# Patient Record
Sex: FEMALE | Race: WHITE | Employment: OTHER | URBAN - METROPOLITAN AREA
[De-identification: names, ages, dates, MRNs, and addresses within clinical notes are randomized per-mention and may not be internally consistent; named-entity substitution may affect disease eponyms.]

---

## 2019-06-22 ENCOUNTER — HOSPITAL ENCOUNTER (INPATIENT)
Age: 69
LOS: 4 days | Discharge: HOME | DRG: 389 | End: 2019-06-26
Attending: EMERGENCY MEDICINE | Admitting: INTERNAL MEDICINE
Payer: MEDICARE

## 2019-06-22 DIAGNOSIS — E87.1 HYPONATREMIA: ICD-10-CM

## 2019-06-22 DIAGNOSIS — E87.6 HYPOKALEMIA: ICD-10-CM

## 2019-06-22 DIAGNOSIS — K56.609 SMALL BOWEL OBSTRUCTION (HCC): Primary | ICD-10-CM

## 2019-06-22 LAB
ABSOLUTE EOS #: 0.04 K/UL (ref 0–0.44)
ABSOLUTE IMMATURE GRANULOCYTE: 0.03 K/UL (ref 0–0.3)
ABSOLUTE LYMPH #: 1.91 K/UL (ref 1.1–3.7)
ABSOLUTE MONO #: 1.25 K/UL (ref 0.1–1.2)
ALBUMIN SERPL-MCNC: 4.2 G/DL (ref 3.5–5.2)
ALBUMIN/GLOBULIN RATIO: 1.5 (ref 1–2.5)
ALP BLD-CCNC: 73 U/L (ref 35–104)
ALT SERPL-CCNC: 15 U/L (ref 5–33)
ANION GAP SERPL CALCULATED.3IONS-SCNC: 14 MMOL/L (ref 9–17)
AST SERPL-CCNC: 21 U/L
BASOPHILS # BLD: 1 % (ref 0–2)
BASOPHILS ABSOLUTE: 0.05 K/UL (ref 0–0.2)
BILIRUB SERPL-MCNC: 0.55 MG/DL (ref 0.3–1.2)
BUN BLDV-MCNC: 7 MG/DL (ref 8–23)
BUN/CREAT BLD: ABNORMAL (ref 9–20)
CALCIUM IONIZED: 1.1 MMOL/L (ref 1.13–1.33)
CALCIUM SERPL-MCNC: 9.9 MG/DL (ref 8.6–10.4)
CHLORIDE BLD-SCNC: 80 MMOL/L (ref 98–107)
CO2: 27 MMOL/L (ref 20–31)
CREAT SERPL-MCNC: 0.71 MG/DL (ref 0.5–0.9)
DIFFERENTIAL TYPE: ABNORMAL
EOSINOPHILS RELATIVE PERCENT: 1 % (ref 1–4)
GFR AFRICAN AMERICAN: >60 ML/MIN
GFR NON-AFRICAN AMERICAN: >60 ML/MIN
GFR SERPL CREATININE-BSD FRML MDRD: ABNORMAL ML/MIN/{1.73_M2}
GFR SERPL CREATININE-BSD FRML MDRD: ABNORMAL ML/MIN/{1.73_M2}
GLUCOSE BLD-MCNC: 110 MG/DL (ref 70–99)
HCT VFR BLD CALC: 36.8 % (ref 36.3–47.1)
HEMOGLOBIN: 12.7 G/DL (ref 11.9–15.1)
IMMATURE GRANULOCYTES: 0 %
LACTIC ACID, WHOLE BLOOD: 1 MMOL/L (ref 0.7–2.1)
LYMPHOCYTES # BLD: 22 % (ref 24–43)
MAGNESIUM: 1.8 MG/DL (ref 1.6–2.6)
MAGNESIUM: 1.8 MG/DL (ref 1.6–2.6)
MCH RBC QN AUTO: 29.4 PG (ref 25.2–33.5)
MCHC RBC AUTO-ENTMCNC: 34.5 G/DL (ref 28.4–34.8)
MCV RBC AUTO: 85.2 FL (ref 82.6–102.9)
MONOCYTES # BLD: 15 % (ref 3–12)
NRBC AUTOMATED: 0 PER 100 WBC
PDW BLD-RTO: 13.2 % (ref 11.8–14.4)
PHOSPHORUS: 4 MG/DL (ref 2.6–4.5)
PLATELET # BLD: 390 K/UL (ref 138–453)
PLATELET ESTIMATE: ABNORMAL
PMV BLD AUTO: 9.1 FL (ref 8.1–13.5)
POTASSIUM SERPL-SCNC: 2.6 MMOL/L (ref 3.7–5.3)
RBC # BLD: 4.32 M/UL (ref 3.95–5.11)
RBC # BLD: ABNORMAL 10*6/UL
SEG NEUTROPHILS: 61 % (ref 36–65)
SEGMENTED NEUTROPHILS ABSOLUTE COUNT: 5.25 K/UL (ref 1.5–8.1)
SODIUM BLD-SCNC: 121 MMOL/L (ref 135–144)
TOTAL PROTEIN: 7 G/DL (ref 6.4–8.3)
WBC # BLD: 8.5 K/UL (ref 3.5–11.3)
WBC # BLD: ABNORMAL 10*3/UL

## 2019-06-22 PROCEDURE — 87641 MR-STAPH DNA AMP PROBE: CPT

## 2019-06-22 PROCEDURE — 2000000000 HC ICU R&B

## 2019-06-22 PROCEDURE — 36415 COLL VENOUS BLD VENIPUNCTURE: CPT

## 2019-06-22 PROCEDURE — 84295 ASSAY OF SERUM SODIUM: CPT

## 2019-06-22 PROCEDURE — 83735 ASSAY OF MAGNESIUM: CPT

## 2019-06-22 PROCEDURE — 99285 EMERGENCY DEPT VISIT HI MDM: CPT

## 2019-06-22 PROCEDURE — 84100 ASSAY OF PHOSPHORUS: CPT

## 2019-06-22 PROCEDURE — 83605 ASSAY OF LACTIC ACID: CPT

## 2019-06-22 PROCEDURE — 80053 COMPREHEN METABOLIC PANEL: CPT

## 2019-06-22 PROCEDURE — 6360000002 HC RX W HCPCS: Performed by: PHYSICIAN ASSISTANT

## 2019-06-22 PROCEDURE — 2580000003 HC RX 258: Performed by: EMERGENCY MEDICINE

## 2019-06-22 PROCEDURE — 2580000003 HC RX 258: Performed by: PHYSICIAN ASSISTANT

## 2019-06-22 PROCEDURE — 82330 ASSAY OF CALCIUM: CPT

## 2019-06-22 PROCEDURE — 6370000000 HC RX 637 (ALT 250 FOR IP): Performed by: EMERGENCY MEDICINE

## 2019-06-22 PROCEDURE — 96365 THER/PROPH/DIAG IV INF INIT: CPT

## 2019-06-22 PROCEDURE — 6360000002 HC RX W HCPCS: Performed by: EMERGENCY MEDICINE

## 2019-06-22 PROCEDURE — 96375 TX/PRO/DX INJ NEW DRUG ADDON: CPT

## 2019-06-22 PROCEDURE — 6360000002 HC RX W HCPCS: Performed by: STUDENT IN AN ORGANIZED HEALTH CARE EDUCATION/TRAINING PROGRAM

## 2019-06-22 PROCEDURE — 83930 ASSAY OF BLOOD OSMOLALITY: CPT

## 2019-06-22 PROCEDURE — 85025 COMPLETE CBC W/AUTO DIFF WBC: CPT

## 2019-06-22 RX ORDER — MAGNESIUM SULFATE 1 G/100ML
2 INJECTION INTRAVENOUS ONCE
Status: COMPLETED | OUTPATIENT
Start: 2019-06-22 | End: 2019-06-23

## 2019-06-22 RX ORDER — TRAZODONE HYDROCHLORIDE 50 MG/1
50 TABLET ORAL NIGHTLY
Status: DISCONTINUED | OUTPATIENT
Start: 2019-06-22 | End: 2019-06-26 | Stop reason: HOSPADM

## 2019-06-22 RX ORDER — SODIUM CHLORIDE 0.9 % (FLUSH) 0.9 %
10 SYRINGE (ML) INJECTION PRN
Status: DISCONTINUED | OUTPATIENT
Start: 2019-06-22 | End: 2019-06-26 | Stop reason: HOSPADM

## 2019-06-22 RX ORDER — MORPHINE SULFATE 2 MG/ML
2 INJECTION, SOLUTION INTRAMUSCULAR; INTRAVENOUS
Status: DISCONTINUED | OUTPATIENT
Start: 2019-06-22 | End: 2019-06-26 | Stop reason: HOSPADM

## 2019-06-22 RX ORDER — POTASSIUM CHLORIDE 7.45 MG/ML
10 INJECTION INTRAVENOUS PRN
Status: DISCONTINUED | OUTPATIENT
Start: 2019-06-22 | End: 2019-06-26 | Stop reason: HOSPADM

## 2019-06-22 RX ORDER — LOSARTAN POTASSIUM 50 MG/1
50 TABLET ORAL DAILY
Status: DISCONTINUED | OUTPATIENT
Start: 2019-06-23 | End: 2019-06-26 | Stop reason: HOSPADM

## 2019-06-22 RX ORDER — 0.9 % SODIUM CHLORIDE 0.9 %
1000 INTRAVENOUS SOLUTION INTRAVENOUS ONCE
Status: COMPLETED | OUTPATIENT
Start: 2019-06-22 | End: 2019-06-22

## 2019-06-22 RX ORDER — ACETAMINOPHEN 325 MG/1
650 TABLET ORAL EVERY 4 HOURS PRN
Status: DISCONTINUED | OUTPATIENT
Start: 2019-06-22 | End: 2019-06-26 | Stop reason: HOSPADM

## 2019-06-22 RX ORDER — LOSARTAN POTASSIUM 50 MG/1
50 TABLET ORAL DAILY
COMMUNITY

## 2019-06-22 RX ORDER — DILTIAZEM HYDROCHLORIDE 300 MG/1
360 CAPSULE, COATED, EXTENDED RELEASE ORAL DAILY
COMMUNITY

## 2019-06-22 RX ORDER — TRAZODONE HYDROCHLORIDE 50 MG/1
50 TABLET ORAL NIGHTLY
COMMUNITY

## 2019-06-22 RX ORDER — DILTIAZEM HYDROCHLORIDE 180 MG/1
360 CAPSULE, COATED, EXTENDED RELEASE ORAL DAILY
Status: DISCONTINUED | OUTPATIENT
Start: 2019-06-23 | End: 2019-06-26 | Stop reason: HOSPADM

## 2019-06-22 RX ORDER — POTASSIUM CHLORIDE 7.45 MG/ML
10 INJECTION INTRAVENOUS ONCE
Status: COMPLETED | OUTPATIENT
Start: 2019-06-22 | End: 2019-06-22

## 2019-06-22 RX ORDER — ALBUTEROL SULFATE 90 UG/1
2 AEROSOL, METERED RESPIRATORY (INHALATION) EVERY 6 HOURS PRN
COMMUNITY

## 2019-06-22 RX ORDER — ESOMEPRAZOLE MAGNESIUM 20 MG/1
TABLET, DELAYED RELEASE ORAL
COMMUNITY

## 2019-06-22 RX ORDER — MAGNESIUM SULFATE 1 G/100ML
1 INJECTION INTRAVENOUS PRN
Status: DISCONTINUED | OUTPATIENT
Start: 2019-06-22 | End: 2019-06-26 | Stop reason: HOSPADM

## 2019-06-22 RX ORDER — POTASSIUM CHLORIDE 7.45 MG/ML
40 INJECTION INTRAVENOUS ONCE
Status: COMPLETED | OUTPATIENT
Start: 2019-06-22 | End: 2019-06-23

## 2019-06-22 RX ORDER — SODIUM CHLORIDE 0.9 % (FLUSH) 0.9 %
10 SYRINGE (ML) INJECTION EVERY 12 HOURS SCHEDULED
Status: DISCONTINUED | OUTPATIENT
Start: 2019-06-22 | End: 2019-06-26 | Stop reason: HOSPADM

## 2019-06-22 RX ORDER — ONDANSETRON 2 MG/ML
4 INJECTION INTRAMUSCULAR; INTRAVENOUS ONCE
Status: COMPLETED | OUTPATIENT
Start: 2019-06-22 | End: 2019-06-22

## 2019-06-22 RX ORDER — MORPHINE SULFATE 4 MG/ML
4 INJECTION, SOLUTION INTRAMUSCULAR; INTRAVENOUS ONCE
Status: COMPLETED | OUTPATIENT
Start: 2019-06-22 | End: 2019-06-22

## 2019-06-22 RX ORDER — ASPIRIN 81 MG/1
81 TABLET ORAL DAILY
Status: DISCONTINUED | OUTPATIENT
Start: 2019-06-23 | End: 2019-06-26 | Stop reason: HOSPADM

## 2019-06-22 RX ORDER — SODIUM CHLORIDE 9 MG/ML
INJECTION, SOLUTION INTRAVENOUS CONTINUOUS
Status: DISCONTINUED | OUTPATIENT
Start: 2019-06-22 | End: 2019-06-24

## 2019-06-22 RX ORDER — ALPRAZOLAM 0.5 MG/1
0.5 TABLET ORAL NIGHTLY PRN
COMMUNITY

## 2019-06-22 RX ORDER — ALBUTEROL SULFATE 90 UG/1
2 AEROSOL, METERED RESPIRATORY (INHALATION) EVERY 6 HOURS PRN
Status: DISCONTINUED | OUTPATIENT
Start: 2019-06-22 | End: 2019-06-26 | Stop reason: HOSPADM

## 2019-06-22 RX ORDER — ONDANSETRON 2 MG/ML
4 INJECTION INTRAMUSCULAR; INTRAVENOUS EVERY 8 HOURS PRN
Status: DISCONTINUED | OUTPATIENT
Start: 2019-06-22 | End: 2019-06-22 | Stop reason: ALTCHOICE

## 2019-06-22 RX ORDER — ONDANSETRON 2 MG/ML
4 INJECTION INTRAMUSCULAR; INTRAVENOUS EVERY 6 HOURS PRN
Status: DISCONTINUED | OUTPATIENT
Start: 2019-06-22 | End: 2019-06-26 | Stop reason: HOSPADM

## 2019-06-22 RX ADMIN — SODIUM CHLORIDE 1000 ML: 9 INJECTION, SOLUTION INTRAVENOUS at 20:38

## 2019-06-22 RX ADMIN — MORPHINE SULFATE 4 MG: 4 INJECTION INTRAVENOUS at 22:46

## 2019-06-22 RX ADMIN — BENZOCAINE, BUTAMBEN, AND TETRACAINE HYDROCHLORIDE 1 SPRAY: .028; .004; .004 AEROSOL, SPRAY TOPICAL at 21:01

## 2019-06-22 RX ADMIN — MORPHINE SULFATE 4 MG: 4 INJECTION INTRAVENOUS at 19:59

## 2019-06-22 RX ADMIN — ONDANSETRON 4 MG: 2 INJECTION INTRAMUSCULAR; INTRAVENOUS at 19:59

## 2019-06-22 RX ADMIN — MAGNESIUM SULFATE HEPTAHYDRATE 2 G: 1 INJECTION, SOLUTION INTRAVENOUS at 23:57

## 2019-06-22 RX ADMIN — POTASSIUM CHLORIDE 10 MEQ: 7.46 INJECTION, SOLUTION INTRAVENOUS at 21:01

## 2019-06-22 RX ADMIN — SODIUM CHLORIDE: 9 INJECTION, SOLUTION INTRAVENOUS at 23:45

## 2019-06-22 ASSESSMENT — ENCOUNTER SYMPTOMS
NAUSEA: 1
SHORTNESS OF BREATH: 0
BACK PAIN: 0
COLOR CHANGE: 0
SORE THROAT: 0
COUGH: 0
VOMITING: 1
ABDOMINAL PAIN: 1
DIARRHEA: 0
BLOOD IN STOOL: 0

## 2019-06-22 ASSESSMENT — PAIN SCALES - GENERAL
PAINLEVEL_OUTOF10: 5
PAINLEVEL_OUTOF10: 5

## 2019-06-22 NOTE — ED PROVIDER NOTES
spondylosis     Lupus (HCC)     Osteoarthritis     PTSD (post-traumatic stress disorder)     Ulcerative colitis ([facility] [address]        SURGICAL HISTORY     History reviewed. No pertinent surgical history. CURRENT MEDICATIONS       Previous Medications    ALBUTEROL SULFATE  (90 BASE) MCG/ACT INHALER    Inhale 2 puffs into the lungs every 6 hours as needed for Wheezing    ALPRAZOLAM (XANAX) 0.5 MG TABLET    Take 0.5 mg by mouth nightly as needed for Sleep. ASPIRIN 81 MG TABLET    Take 81 mg by mouth daily    CYANOCOBALAMIN 1000 MCG TABLET    Take 1,000 mcg by mouth daily    DILTIAZEM (CARDIZEM CD) 300 MG EXTENDED RELEASE CAPSULE    Take 360 mg by mouth daily    ESOMEPRAZOLE MAGNESIUM 20 MG TBEC    Take by mouth    FLUTICASONE FUROATE IN    Inhale into the lungs    LOSARTAN (COZAAR) 50 MG TABLET    Take 50 mg by mouth daily    TRAZODONE (DESYREL) 50 MG TABLET    Take 50 mg by mouth nightly    UMECLIDINIUM-VILANTEROL IN    Inhale into the lungs    VITAMIN D (CHOLECALCIFEROL) 5000 UNITS CAPS CAPSULE    Take 5,000 Units by mouth daily       ALLERGIES     Propoxyphene; Sulfa antibiotics; Tape Karyna Cordon tape]; and Vicodin [hydrocodone-acetaminophen]  Reviewed   FAMILY HISTORY     History reviewed. No pertinent family history. No family status information on file. SOCIAL HISTORY          PHYSICAL EXAM    (up to 7 for level 4, 8 or more for level 5)     Vitals:    06/22/19 2002 06/22/19 2207   BP: (!) 142/85 137/88   Pulse: 107 92   Resp: 18 16   Temp: 98.2 °F (36.8 °C)    TempSrc: Oral    SpO2: 97% 94%       Physical Exam   Constitutional: She is oriented to person, place, and time. She appears well-developed and well-nourished. No distress. HENT:   Head: Normocephalic and atraumatic. Eyes: Pupils are equal, round, and reactive to light. Conjunctivae and EOM are normal.   Neck: Normal range of motion. Neck supple. No meningeal signs.    Cardiovascular: Normal rate, regular rhythm, normal heart sounds and intact distal pulses. Exam reveals no gallop and no friction rub. No murmur heard. Pulmonary/Chest: Effort normal and breath sounds normal. No stridor. No respiratory distress. She has no wheezes. She has no rales. Abdominal: Soft. Bowel sounds are normal. She exhibits no distension and no mass. There is generalized tenderness. There is no rebound, no guarding and no CVA tenderness. No hernia. No peritoneal signs. Musculoskeletal: Normal range of motion. Neurological: She is alert and oriented to person, place, and time. No cranial nerve deficit. Skin: Skin is warm. Capillary refill takes less than 2 seconds. She is not diaphoretic. Psychiatric: She has a normal mood and affect. Her behavior is normal. Judgment and thought content normal.   Nursing note and vitals reviewed.         DIAGNOSTIC RESULTS       No orders to display         LABS:  Labs Reviewed   CBC WITH AUTO DIFFERENTIAL - Abnormal; Notable for the following components:       Result Value    Lymphocytes 22 (*)     Monocytes 15 (*)     Absolute Mono # 1.25 (*)     All other components within normal limits   COMPREHENSIVE METABOLIC PANEL - Abnormal; Notable for the following components:    Glucose 110 (*)     BUN 7 (*)     Sodium 121 (*)     Potassium 2.6 (*)     Chloride 80 (*)     All other components within normal limits   CALCIUM, IONIZED - Abnormal; Notable for the following components:    Calcium, Ion 1.10 (*)     All other components within normal limits   LACTIC ACID, WHOLE BLOOD   PHOSPHORUS   MAGNESIUM           EMERGENCY DEPARTMENT COURSE and DIFFERENTIAL DIAGNOSIS/MDM:   Vitals:    Vitals:    06/22/19 2002 06/22/19 2207   BP: (!) 142/85 137/88   Pulse: 107 92   Resp: 18 16   Temp: 98.2 °F (36.8 °C)    TempSrc: Oral    SpO2: 97% 94%       This is a 80-year-old female presenting to the emergency department complaining of abdominal pain with already prior CT scan and abdominal laboratory studies revealing low potassium and low sodium along with a small bowel obstruction. Surgery was already notified about this transfer and they are down to see the patient. We will repeat the laboratory studies at this time and surgery stated that this will be medical admission due to the electrolytes and she did receive replete minute of her electrolytes and we will replete them here as well. I will speak with medicine for admission. Patient did have a small bowel obstruction prior to this and it was medically managed with no surgical intervention. We will give IV potassium here in the ED. her potassium did rise slightly and sodium is approximately 121 at this time. Again we will speak to medicine for admission with surgery on as consult. Possibly due to sodium levels medicine would like us to admit to ICU at this time. ICU has been consulted. ICU graciously accepted admission. Patient will be turned over to Dr. Anjali Lynn while awaiting transfer to the floor. This patient was seen by the attending [phone] they agreed with the assessment and plan. CONSULTS:  IP CONSULT TO GENERAL SURGERY  IP CONSULT TO INTERNAL MEDICINE  IP CONSULT TO INTERNAL MEDICINE  IP CONSULT TO CRITICAL CARE    PROCEDURES:  Procedures    FINAL IMPRESSION      1. Small bowel obstruction ([address]    2. Hyponatremia    3.  Hypokalemia          DISPOSITION/PLAN   DISPOSITION Admitted 06/22/2019 10:15:37 PM      PATIENT REFERRED TO:  Rene Mao, APRN - CNP  [address] [facility] [phone]            DISCHARGE MEDICATIONS:  New Prescriptions    No medications on file       (Please note that portions of this note were completed with a voice recognition program.  Efforts were made to edit the dictations but occasionally words are mis-transcribed.)    [address] [facility] Guzman PA-C  06/22/19 92 Yusef Lanier PA-C  06/22/19 4438

## 2019-06-22 NOTE — ED NOTES
Pt to room 7 as transfer from [address] [place] [address] for small bowel obstruction. Pt reports that for the past 2 days, she was having lower abd pain. Pt states that she has had bowel obstructions in the past, and that the symptoms felt similar. Pt states that her abdomen was distended when she went to [facility]. While at [facility], they found that pt did have an obstruction. NG tube was placed PTA. Upon arrival, pt reports headache pain, states that her abdomen still has discomfort, but that she has felt better after the NG tube was placed. Pt alert and oriented x4, talking in complete sentences, respirations even and unlabored. Pt acting age appropriate. White board updated, will continue to monitor.        Diana Mendez RN  06/22/19 1955

## 2019-06-22 NOTE — ED NOTES
Bed: 07  Expected date:   Expected time:   Means of arrival:   Comments:  Alexey Bhagat, RN  06/22/19 1940

## 2019-06-23 ENCOUNTER — APPOINTMENT (OUTPATIENT)
Dept: GENERAL RADIOLOGY | Age: 69
DRG: 389 | End: 2019-06-23
Payer: MEDICARE

## 2019-06-23 LAB
-: ABNORMAL
ABSOLUTE EOS #: 0.08 K/UL (ref 0–0.44)
ABSOLUTE IMMATURE GRANULOCYTE: 0.04 K/UL (ref 0–0.3)
ABSOLUTE LYMPH #: 1.53 K/UL (ref 1.1–3.7)
ABSOLUTE MONO #: 1.03 K/UL (ref 0.1–1.2)
ALBUMIN SERPL-MCNC: 4 G/DL (ref 3.5–5.2)
ALBUMIN/GLOBULIN RATIO: 1.4 (ref 1–2.5)
ALP BLD-CCNC: 71 U/L (ref 35–104)
ALT SERPL-CCNC: 14 U/L (ref 5–33)
AMORPHOUS: ABNORMAL
ANION GAP SERPL CALCULATED.3IONS-SCNC: 11 MMOL/L (ref 9–17)
AST SERPL-CCNC: 19 U/L
BACTERIA: ABNORMAL
BASOPHILS # BLD: 1 % (ref 0–2)
BASOPHILS ABSOLUTE: 0.04 K/UL (ref 0–0.2)
BILIRUB SERPL-MCNC: 0.43 MG/DL (ref 0.3–1.2)
BILIRUBIN URINE: NEGATIVE
BUN BLDV-MCNC: 8 MG/DL (ref 8–23)
BUN/CREAT BLD: ABNORMAL (ref 9–20)
CALCIUM SERPL-MCNC: 9.6 MG/DL (ref 8.6–10.4)
CASTS UA: ABNORMAL /LPF (ref 0–8)
CHLORIDE BLD-SCNC: 84 MMOL/L (ref 98–107)
CHLORIDE, UR: 20 MMOL/L
CO2: 31 MMOL/L (ref 20–31)
COLOR: YELLOW
CREAT SERPL-MCNC: 0.83 MG/DL (ref 0.5–0.9)
CREATININE URINE: 33.7 MG/DL (ref 28–217)
CRYSTALS, UA: ABNORMAL /HPF
DIFFERENTIAL TYPE: ABNORMAL
EOSINOPHILS RELATIVE PERCENT: 2 % (ref 1–4)
EPITHELIAL CELLS UA: ABNORMAL /HPF (ref 0–5)
GFR AFRICAN AMERICAN: >60 ML/MIN
GFR NON-AFRICAN AMERICAN: >60 ML/MIN
GFR SERPL CREATININE-BSD FRML MDRD: ABNORMAL ML/MIN/{1.73_M2}
GFR SERPL CREATININE-BSD FRML MDRD: ABNORMAL ML/MIN/{1.73_M2}
GLUCOSE BLD-MCNC: 112 MG/DL (ref 70–99)
GLUCOSE URINE: NEGATIVE
HCT VFR BLD CALC: 37.2 % (ref 36.3–47.1)
HEMOGLOBIN: 12.3 G/DL (ref 11.9–15.1)
IMMATURE GRANULOCYTES: 1 %
KETONES, URINE: NEGATIVE
LEUKOCYTE ESTERASE, URINE: NEGATIVE
LYMPHOCYTES # BLD: 28 % (ref 24–43)
MAGNESIUM: 2.3 MG/DL (ref 1.6–2.6)
MCH RBC QN AUTO: 28.8 PG (ref 25.2–33.5)
MCHC RBC AUTO-ENTMCNC: 33.1 G/DL (ref 28.4–34.8)
MCV RBC AUTO: 87.1 FL (ref 82.6–102.9)
MONOCYTES # BLD: 19 % (ref 3–12)
MRSA, DNA, NASAL: NORMAL
MUCUS: ABNORMAL
NITRITE, URINE: NEGATIVE
NRBC AUTOMATED: 0 PER 100 WBC
OSMOLALITY URINE: 93 MOSM/KG (ref 80–1300)
OTHER OBSERVATIONS UA: ABNORMAL
PDW BLD-RTO: 13.4 % (ref 11.8–14.4)
PH UA: 7 (ref 5–8)
PHOSPHORUS: 3.9 MG/DL (ref 2.6–4.5)
PLATELET # BLD: 358 K/UL (ref 138–453)
PLATELET ESTIMATE: ABNORMAL
PMV BLD AUTO: 9.1 FL (ref 8.1–13.5)
POTASSIUM SERPL-SCNC: 2.5 MMOL/L (ref 3.7–5.3)
POTASSIUM SERPL-SCNC: 2.8 MMOL/L (ref 3.7–5.3)
POTASSIUM SERPL-SCNC: 3.3 MMOL/L (ref 3.7–5.3)
POTASSIUM, UR: 4.6 MMOL/L
PROTEIN UA: NEGATIVE
RBC # BLD: 4.27 M/UL (ref 3.95–5.11)
RBC # BLD: ABNORMAL 10*6/UL
RBC UA: ABNORMAL /HPF (ref 0–2)
RENAL EPITHELIAL, UA: ABNORMAL /HPF
SEG NEUTROPHILS: 50 % (ref 36–65)
SEGMENTED NEUTROPHILS ABSOLUTE COUNT: 2.72 K/UL (ref 1.5–8.1)
SERUM OSMOLALITY: 245 MOSM/KG (ref 275–295)
SODIUM BLD-SCNC: 120 MMOL/L (ref 135–144)
SODIUM BLD-SCNC: 120 MMOL/L (ref 135–144)
SODIUM BLD-SCNC: 125 MMOL/L (ref 135–144)
SODIUM BLD-SCNC: 126 MMOL/L (ref 135–144)
SODIUM BLD-SCNC: 126 MMOL/L (ref 135–144)
SODIUM,UR: <20 MMOL/L
SPECIFIC GRAVITY UA: 1 (ref 1–1.03)
SPECIMEN DESCRIPTION: NORMAL
TOTAL PROTEIN, URINE: 5 MG/DL
TOTAL PROTEIN: 6.9 G/DL (ref 6.4–8.3)
TRICHOMONAS: ABNORMAL
TURBIDITY: CLEAR
URINE HGB: NEGATIVE
UROBILINOGEN, URINE: NORMAL
WBC # BLD: 5.4 K/UL (ref 3.5–11.3)
WBC # BLD: ABNORMAL 10*3/UL
WBC UA: ABNORMAL /HPF (ref 0–5)
YEAST: ABNORMAL

## 2019-06-23 PROCEDURE — 74018 RADEX ABDOMEN 1 VIEW: CPT

## 2019-06-23 PROCEDURE — 36415 COLL VENOUS BLD VENIPUNCTURE: CPT

## 2019-06-23 PROCEDURE — 80053 COMPREHEN METABOLIC PANEL: CPT

## 2019-06-23 PROCEDURE — 2580000003 HC RX 258: Performed by: EMERGENCY MEDICINE

## 2019-06-23 PROCEDURE — 83735 ASSAY OF MAGNESIUM: CPT

## 2019-06-23 PROCEDURE — 99291 CRITICAL CARE FIRST HOUR: CPT | Performed by: INTERNAL MEDICINE

## 2019-06-23 PROCEDURE — 6360000002 HC RX W HCPCS: Performed by: INTERNAL MEDICINE

## 2019-06-23 PROCEDURE — 84156 ASSAY OF PROTEIN URINE: CPT

## 2019-06-23 PROCEDURE — 84300 ASSAY OF URINE SODIUM: CPT

## 2019-06-23 PROCEDURE — 84133 ASSAY OF URINE POTASSIUM: CPT

## 2019-06-23 PROCEDURE — 2580000003 HC RX 258: Performed by: INTERNAL MEDICINE

## 2019-06-23 PROCEDURE — 6360000002 HC RX W HCPCS: Performed by: EMERGENCY MEDICINE

## 2019-06-23 PROCEDURE — 2000000000 HC ICU R&B

## 2019-06-23 PROCEDURE — 85025 COMPLETE CBC W/AUTO DIFF WBC: CPT

## 2019-06-23 PROCEDURE — 82570 ASSAY OF URINE CREATININE: CPT

## 2019-06-23 PROCEDURE — 6370000000 HC RX 637 (ALT 250 FOR IP): Performed by: STUDENT IN AN ORGANIZED HEALTH CARE EDUCATION/TRAINING PROGRAM

## 2019-06-23 PROCEDURE — 82436 ASSAY OF URINE CHLORIDE: CPT

## 2019-06-23 PROCEDURE — 6370000000 HC RX 637 (ALT 250 FOR IP): Performed by: EMERGENCY MEDICINE

## 2019-06-23 PROCEDURE — 81001 URINALYSIS AUTO W/SCOPE: CPT

## 2019-06-23 PROCEDURE — 83935 ASSAY OF URINE OSMOLALITY: CPT

## 2019-06-23 PROCEDURE — 84295 ASSAY OF SERUM SODIUM: CPT

## 2019-06-23 PROCEDURE — 2500000003 HC RX 250 WO HCPCS: Performed by: STUDENT IN AN ORGANIZED HEALTH CARE EDUCATION/TRAINING PROGRAM

## 2019-06-23 PROCEDURE — 84132 ASSAY OF SERUM POTASSIUM: CPT

## 2019-06-23 RX ORDER — POTASSIUM CHLORIDE 20 MEQ/1
40 TABLET, EXTENDED RELEASE ORAL ONCE
Status: COMPLETED | OUTPATIENT
Start: 2019-06-23 | End: 2019-06-23

## 2019-06-23 RX ORDER — ALPRAZOLAM 0.5 MG/1
0.5 TABLET ORAL ONCE
Status: COMPLETED | OUTPATIENT
Start: 2019-06-23 | End: 2019-06-23

## 2019-06-23 RX ORDER — 0.9 % SODIUM CHLORIDE 0.9 %
10 VIAL (ML) INJECTION ONCE
Status: DISCONTINUED | OUTPATIENT
Start: 2019-06-23 | End: 2019-06-23 | Stop reason: ALTCHOICE

## 2019-06-23 RX ORDER — FUROSEMIDE 10 MG/ML
20 INJECTION INTRAMUSCULAR; INTRAVENOUS ONCE
Status: COMPLETED | OUTPATIENT
Start: 2019-06-23 | End: 2019-06-23

## 2019-06-23 RX ORDER — DEXTROSE MONOHYDRATE 50 MG/ML
INJECTION, SOLUTION INTRAVENOUS CONTINUOUS
Status: DISCONTINUED | OUTPATIENT
Start: 2019-06-23 | End: 2019-06-23

## 2019-06-23 RX ORDER — POTASSIUM CHLORIDE 20 MEQ/1
20 TABLET, EXTENDED RELEASE ORAL 2 TIMES DAILY WITH MEALS
Status: DISCONTINUED | OUTPATIENT
Start: 2019-06-23 | End: 2019-06-23

## 2019-06-23 RX ORDER — PANTOPRAZOLE SODIUM 40 MG/10ML
40 INJECTION, POWDER, LYOPHILIZED, FOR SOLUTION INTRAVENOUS DAILY
Status: DISCONTINUED | OUTPATIENT
Start: 2019-06-23 | End: 2019-06-23 | Stop reason: ALTCHOICE

## 2019-06-23 RX ORDER — HALOPERIDOL 5 MG/ML
2 INJECTION INTRAMUSCULAR ONCE
Status: DISCONTINUED | OUTPATIENT
Start: 2019-06-23 | End: 2019-06-26 | Stop reason: HOSPADM

## 2019-06-23 RX ORDER — POTASSIUM CHLORIDE 20 MEQ/1
20 TABLET, EXTENDED RELEASE ORAL ONCE
Status: COMPLETED | OUTPATIENT
Start: 2019-06-23 | End: 2019-06-23

## 2019-06-23 RX ADMIN — POTASSIUM CHLORIDE 40 MEQ: 7.46 INJECTION, SOLUTION INTRAVENOUS at 03:58

## 2019-06-23 RX ADMIN — FUROSEMIDE 20 MG: 10 INJECTION, SOLUTION INTRAVENOUS at 20:11

## 2019-06-23 RX ADMIN — TRAZODONE HYDROCHLORIDE 50 MG: 50 TABLET ORAL at 00:25

## 2019-06-23 RX ADMIN — LOSARTAN POTASSIUM 50 MG: 50 TABLET, FILM COATED ORAL at 08:58

## 2019-06-23 RX ADMIN — Medication 10 ML: at 00:26

## 2019-06-23 RX ADMIN — MORPHINE SULFATE 2 MG: 2 INJECTION, SOLUTION INTRAMUSCULAR; INTRAVENOUS at 05:38

## 2019-06-23 RX ADMIN — SODIUM CHLORIDE, PRESERVATIVE FREE 10 ML: 5 INJECTION INTRAVENOUS at 19:26

## 2019-06-23 RX ADMIN — FUROSEMIDE 20 MG: 10 INJECTION, SOLUTION INTRAVENOUS at 02:17

## 2019-06-23 RX ADMIN — ONDANSETRON 4 MG: 2 INJECTION INTRAMUSCULAR; INTRAVENOUS at 09:36

## 2019-06-23 RX ADMIN — ALPRAZOLAM 0.5 MG: 0.5 TABLET ORAL at 10:09

## 2019-06-23 RX ADMIN — ASPIRIN 81 MG: 81 TABLET ORAL at 08:57

## 2019-06-23 RX ADMIN — SODIUM CHLORIDE, PRESERVATIVE FREE 10 ML: 5 INJECTION INTRAVENOUS at 09:00

## 2019-06-23 RX ADMIN — POTASSIUM CHLORIDE 20 MEQ: 1500 TABLET, EXTENDED RELEASE ORAL at 14:18

## 2019-06-23 RX ADMIN — Medication 10 ML: at 19:27

## 2019-06-23 RX ADMIN — DEXTROSE MONOHYDRATE: 50 INJECTION, SOLUTION INTRAVENOUS at 07:21

## 2019-06-23 RX ADMIN — MORPHINE SULFATE 2 MG: 2 INJECTION, SOLUTION INTRAMUSCULAR; INTRAVENOUS at 14:56

## 2019-06-23 RX ADMIN — DILTIAZEM HYDROCHLORIDE 360 MG: 180 CAPSULE, COATED, EXTENDED RELEASE ORAL at 08:59

## 2019-06-23 RX ADMIN — SODIUM CHLORIDE, PRESERVATIVE FREE 10 ML: 5 INJECTION INTRAVENOUS at 00:26

## 2019-06-23 RX ADMIN — TRAZODONE HYDROCHLORIDE 50 MG: 50 TABLET ORAL at 20:25

## 2019-06-23 RX ADMIN — POTASSIUM CHLORIDE 40 MEQ: 20 TABLET, EXTENDED RELEASE ORAL at 08:59

## 2019-06-23 RX ADMIN — POTASSIUM CHLORIDE 40 MEQ: 20 TABLET, EXTENDED RELEASE ORAL at 11:34

## 2019-06-23 RX ADMIN — POTASSIUM CHLORIDE 20 MEQ: 1500 TABLET, EXTENDED RELEASE ORAL at 17:17

## 2019-06-23 RX ADMIN — ENOXAPARIN SODIUM 40 MG: 40 INJECTION SUBCUTANEOUS at 08:58

## 2019-06-23 RX ADMIN — FAMOTIDINE 20 MG: 10 INJECTION, SOLUTION INTRAVENOUS at 19:26

## 2019-06-23 RX ADMIN — CALCIUM GLUCONATE 1 G: 98 INJECTION, SOLUTION INTRAVENOUS at 02:56

## 2019-06-23 ASSESSMENT — PAIN SCALES - GENERAL
PAINLEVEL_OUTOF10: 10
PAINLEVEL_OUTOF10: 6

## 2019-06-23 NOTE — CARE COORDINATION
Case Management Initial Discharge Plan  Rossie Leventhal,             Met with:patient to discuss discharge plans. Information verified: address, contacts, phone number, , insurance Yes  PCP: Marleny Boone., APRN - CNP  Date of last visit: 6 weeks    Insurance Provider: [employer]     Discharge Planning    Living Arrangements:  Alone   Support Systems:  [id number] has 1 stories  1 stairs to climb to get into front door, n/a stairs to climb to reach second floor  Location of bedroom/bathroom in home     Patient able to perform ADL's:Assisted    Current Services (outpatient & in home) hc aide with [facility]  DME equipment: shower chair, cane   DME provider: medicaid    Pharmacy: [facility]   Potential Assistance Purchasing Medications:  No  Does patient want to participate in local refill/ meds to beds program?  Yes    Potential Assistance Needed:       Patient agreeable to home care: Yes  Freedom of choice provided:  yes    Prior SNF/Rehab Placement and Facility: none  Agreeable to SNF/Rehab: No  [place] of choice provided: n/a   Evaluation: n/a    Expected Discharge date:     Patient expects to be discharged to: Follow Up Appointment: Best Day/ Time:      Transportation provider: daughter  Transportation arrangements needed for discharge: No    Readmission Risk              Risk of Unplanned Readmission:        12             Does patient have a readmission risk score greater than 14?: No  If yes, follow-up appointment must be made within 7 days of discharge. Discharge Plan: home with [facility] current. Referral sent.            Electronically signed by Shon Sevilla RN on 19 at 3:42 PM

## 2019-06-23 NOTE — PROGRESS NOTES
Respiratory care evaluation. Received pt on RA, awake, no distress, breath sounds clear apices, diminished bases. Pt states she uses albuterol PRN at home. Albuterol order is already in place. Pt states she will call if feels short of breath.

## 2019-06-23 NOTE — PROGRESS NOTES
PROGRESS NOTE    PATIENT NAME: Angeline Rivas  MEDICAL RECORD NO. [id number]  DATE: 2019  PRIMARY CARE PHYSICIAN: Nicole Barlow., APRN - CNP    HD: # 1    ASSESSMENT    Patient Active Problem List   Diagnosis    Small bowel obstruction [facility])       MEDICAL DECISION MAKING AND PLAN    1. Continue medical management per primary  2. NPO with NGT to LIS  3. KUB showing no dilated loops of bowel, no air fluid levels, gas present in colon  4. IVF  5. I/Os  6. Replace electrolytes   7. Continue medical management, patient soft and nondistended this morning, Pt had no nausea overnight, minimal NG output; no acute surgical intervention at this time, will continue to monitor closely. SUBJECTIVE    Angeline Rivas was seen and examined this morning. Claims she is feeling better than when she came in. She denies any nausea or emesis overnight. Minimal output from NGT. Claims abd pain is improved. Denies flatus or BM. NPO. Has been out of bed to void without difficulty. OBJECTIVE  VITALS: Temp: Temp: 98.2 °F (36.8 °C)Temp  Av.1 °F (36.7 °C)  Min: 97.9 °F (36.6 °C)  Max: 98.2 °F (66.3 °C) BP Systolic (37GNS), JETHRO:587 , Min:101 , WWX:227   Diastolic (07WHX), CDV:33, Min:66, Max:88   Pulse Pulse  Av  Min: 83  Max: 107 Resp Resp  Av.9  Min: 8  Max: 18 Pulse ox SpO2  Av.2 %  Min: 89 %  Max: 98 %  GENERAL: alert, no distress  LUNGS: no increased WOB  HEART: RRR  ABDOMEN: soft, ND, tender to palpation near bottom of previous scar where umbilicus would be; no guarding, non peritoneal; previous healed surgical scar  EXTERMITY: no cyanosis, clubbing or edema    I/O last 3 completed shifts: In: 344 [I.V.:344]  Out: 1350 [Urine:1350]    Drain/tube output:   In: 344 [I.V.:344]  Out: 1350 [Urine:1350]    LAB:  CBC:   Recent Labs     19  0454   WBC 8.5 5.4   HGB 12.7 12.3   HCT 36.8 37.2   MCV 85.2 87.1    358     BMP:   Recent Labs     19  9263 06/23/19  0454   * 120* 126*   K 2.6*  --  2.5*   CL 80*  --  84*   CO2 27  --  31   BUN 7*  --  8   CREATININE 0.71  --  0.83   GLUCOSE 110*  --  112*     COAGS:   Recent Labs     06/22/19 2020 06/23/19  0454   PROT 7.0 6.9       RADIOLOGY:  KUB   Impression:     Nonspecific bowel gas pattern with presence of distal gas. And NG tube projects in the upper abdomen likely in the gastric body. Glenna Short DO  6/23/19, 8:03 AM       Trauma, Emergency and Critical Surgical Services  Attending Note      I have reviewed the above TECSS note(s) and confirmed the key elements of the medical history and physical exam. I have discussed the findings, established the care plan and recommendations with Resident, TECSS RN, bedside nurse. Seen and examined by me this am.  Feels better but still no flatus. Abd films reviewed. Correct potassium.   Recheck films in am.    Disha Patel MD  6/23/2019  8:49 AM

## 2019-06-23 NOTE — H&P
Critical Care - History and Physical Examination    Patient's name:  Sebastien Betancourt  Medical Record Number: [id number]  Patient's account/billing number: [de-identified]  Patient's YOB: 1950  Age: 79 y.o. Date of Admission: 6/22/2019  7:40 PM  Date of History and Physical Examination: 6/22/2019      Primary Care Physician: Octavio Paris., APRN - CNP  Attending Physician:    Code Status: No Order    Chief complaint: Nausea, vomiting    HISTORY OF PRESENT ILLNESS:   History was obtained from chart review and the patient. Sebastien Betancourt is a 79 y.o. who presents as a transfer to the ER from [facility]. Patient came in with a small bowel obstruction that was seen on the CT imaging at [facility]. She has been having significant nausea vomitting over the last 3 days with minimal PO intake. Says that she has been having significant abdominal distension as well. No fevers, chills. No diarrhea, last bowel movement was 5 days ago. No flatus for 4 days. Hx of 2 previous abdominal surgeries including a hernia repair and a bowel resection hx. Hx of crohns and lupus disease as well. Distension significantly better once the NG tube was placed. General surgery on board as well. Outside hospital labs were concerning for hypokalemia, and hyponatremia with a sodium of 117 at [facility]. Sodium was 121 in the ER here, she was given a 1 L bolus of fluids in the ER. Past Medical History:        Diagnosis Date    Hypertension     IBS (irritable bowel syndrome)     Lumbago     Lumbar neuritis     Lumbar spondylosis     Lupus (HCC)     Osteoarthritis     PTSD (post-traumatic stress disorder)     Ulcerative colitis ([facility] [address]        Past Surgical History:  History reviewed. No pertinent surgical history. Allergies:     Allergies   Allergen Reactions    Propoxyphene     Sulfa Antibiotics     Tape Irene Goring Tape]     Vicodin [Hydrocodone-Acetaminophen]          Home Meds:   Prior to Admission medications    Medication Sig Start Date End Date Taking? Authorizing Provider   UMECLIDINIUM-VILANTEROL IN Inhale into the lungs   Yes Historical Provider, MD   cyanocobalamin 1000 MCG tablet Take 1,000 mcg by mouth daily   Yes Historical Provider, MD   albuterol sulfate  (90 Base) MCG/ACT inhaler Inhale 2 puffs into the lungs every 6 hours as needed for Wheezing   Yes Historical Provider, MD   FLUTICASONE FUROATE IN Inhale into the lungs   Yes Historical Provider, MD   losartan (COZAAR) 50 MG tablet Take 50 mg by mouth daily   Yes Historical Provider, MD   Esomeprazole Magnesium 20 MG TBEC Take by mouth   Yes Historical Provider, MD   ALPRAZolam (XANAX) 0.5 MG tablet Take 0.5 mg by mouth nightly as needed for Sleep. Yes Historical Provider, MD   traZODone (DESYREL) 50 MG tablet Take 50 mg by mouth nightly   Yes Historical Provider, MD   vitamin D (CHOLECALCIFEROL) 5000 units CAPS capsule Take 5,000 Units by mouth daily   Yes Historical Provider, MD   diltiazem (CARDIZEM CD) 300 MG extended release capsule Take 360 mg by mouth daily   Yes Historical Provider, MD   aspirin 81 MG tablet Take 81 mg by mouth daily   Yes Historical Provider, MD       Social History:   TOBACCO:   has no tobacco history on file. ETOH:   has no alcohol history on file. DRUGS:  has no drug history on file. OCCUPATION:      Family History:   History reviewed. No pertinent family history. REVIEW OF SYSTEMS (ROS):    Constitutional: Negative for chills, fatigue and fever. HENT: Negative for sore throat. Respiratory: Negative for cough and shortness of breath. Cardiovascular: Negative for chest pain, palpitations and leg swelling. Gastrointestinal: Positive for abdominal pain, nausea and vomiting, + constipation. Negative for blood in stool and diarrhea. Genitourinary: Negative for flank pain. Musculoskeletal: Negative for back pain, neck pain and neck stiffness. Skin: Negative for color change. Neurological: Negative for dizziness, facial asymmetry, speech difficulty, weakness, light-headedness, numbness and headaches. Physical Exam:    Vitals: /88   Pulse 92   Temp 98.2 °F (36.8 °C) (Oral)   Resp 16   SpO2 94%     Last Body weight:   Wt Readings from Last 3 Encounters:   No data found for Wt       Body Mass Index : There is no height or weight on file to calculate BMI. PHYSICAL EXAMINATION :    General appearance - alert, well appearing, and in no distress  Mental status - alert, oriented to person, place, and time  Eyes - pupils equal and reactive, extraocular eye movements intact  Ears - bilateral TM's and external ear canals normal  Nose - normal and patent, no erythema, discharge or polyps  Mouth - mucous membranes moist, pharynx normal without lesions  Neck - supple, no significant adenopathy  Chest - clear to auscultation, no wheezes, rales or rhonchi, symmetric air entry  Heart - normal rate, regular rhythm, normal S1, S2, no murmurs, rubs, clicks or gallops  Abdomen - distended, mild to mod generalized tenderness, no masses or organomegaly  Neurological - alert, oriented, normal speech, no focal findings or movement disorder noted}  Extremities - peripheral pulses normal, no pedal edema, no clubbing or cyanosis  Skin - normal coloration and turgor, no rashes, no suspicious skin lesions noted       Any additional physical findings:      Laboratory findings:-    CBC:   Recent Labs     06/22/19 2020   WBC 8.5   HGB 12.7        BMP:    Recent Labs     06/22/19 2020   *   K 2.6*   CL 80*   CO2 27   BUN 7*   CREATININE 0.71   GLUCOSE 110*     S. Calcium:  Recent Labs     06/22/19 2020   CALCIUM 9.9     S. Ionized Calcium:No results for input(s): IONCA in the last 72 hours. S. Magnesium:  Recent Labs     06/22/19 2020   MG 1.8     S. Phosphorus:  Recent Labs     06/22/19 2020   PHOS 4.0     S.  Glucose:No results for input(s): POCGLU in the last 72

## 2019-06-23 NOTE — ED PROVIDER NOTES
[facility] ED  Emergency Department  Emergency Medicine Resident Sign-out     Care of Sosa Thomas was assumed from [facility], [address] [place] [address] and is being seen for Abdominal Pain  . The patient's initial evaluation and plan have been discussed with the prior provider who initially evaluated the patient. EMERGENCY DEPARTMENT COURSE / MEDICAL DECISION MAKING:       MEDICATIONS GIVEN:  Orders Placed This Encounter   Medications    morphine injection 4 mg    ondansetron (ZOFRAN) injection 4 mg    0.9 % sodium chloride bolus    butamben-tetracaine-benzocaine (CETACAINE) spray 1 spray    potassium chloride 10 mEq/100 mL IVPB (Peripheral Line)     LABS / RADIOLOGY:     Labs Reviewed   CBC WITH AUTO DIFFERENTIAL - Abnormal; Notable for the following components:       Result Value    Lymphocytes 22 (*)     Monocytes 15 (*)     Absolute Mono # 1.25 (*)     All other components within normal limits   COMPREHENSIVE METABOLIC PANEL - Abnormal; Notable for the following components:    Glucose 110 (*)     BUN 7 (*)     Sodium 121 (*)     Potassium 2.6 (*)     Chloride 80 (*)     All other components within normal limits   CALCIUM, IONIZED - Abnormal; Notable for the following components:    Calcium, Ion 1.10 (*)     All other components within normal limits   LACTIC ACID, WHOLE BLOOD   PHOSPHORUS   MAGNESIUM     No results found. RECENT VITALS:     Temp: 98.2 °F (36.8 °C),  Pulse: 107, Resp: 18, BP: (!) 142/85, SpO2: 97 %    This patient is a 79 y.o. Female transfer from [facility] w/ abdominal pain, distention, nausea, vomiting. Hx SBO in the past which was medically managed. CT confirmed SBO, currently has an NGT in place. K is 2.3, Na ia 117. Surgery recommending medical management at this time w/ admit to internal medicine. Redraw Na 121, K 2.8. Patient given 10meq of k. Medicine recommending admission to critical care for hyponatremia and close monitoring of Na levels.    Patient admitted to critical care, attending Dr. Hermila Pichardo. OUTSTANDING TASKS / RECOMMENDATIONS:    1. Pending transfer to floor     FINAL IMPRESSION:     1. Small bowel obstruction (HCC)      DISPOSITION:         DISPOSITION:  []  Discharge   []  Transfer -    [x]  Admission -  Critical care   []  Against Medical Advice   []  Eloped   FOLLOW-UP: No follow-up provider specified.    DISCHARGE MEDICATIONS: New Prescriptions    No medications on file          Angie Majano DO  Emergency Medicine Resident  [place], [place]  06/23/19 9911

## 2019-06-23 NOTE — CONSULTS
Nephrology Consult Note    Reason for Consult:  hyponatremia  Requesting Physician:  Dr. Darryle Buhl    Chief Complaint:  n/v    History Obtained From:  Patient, EMR, RN    History of Present Illness: This is a 79 y.o. female who presented in transfer from [address] [place] [address] to have a SBO seen on CT with contrast at [facility]. At home she had n/v x 3 days prior seeking medical attention. She noticed abdominal distension, no BM x 5 days, no flatus. No F/C. Hx of hernia repair, hx of bowel resection, hx Crohn's, and Lupus. Abdominal distension and vomiting improved with NG placement. General surgery is following. She is NPO. But at home she was not able to eat properly, but she did try to drink some water. We have been consulted for hyponatremia, Na at 2020 on 6/22 was 121, at 2300 on 6/22 was 120 then this am at 0454 126. Initial Na at [facility] was 117. She was given 1L bolus of NS in the ED here. She was started on D5% at 75 cc/hr this am at 0700. Sodium has been ordered every 4 hours. K at admission 2.6 this am 2.5. Being replaced per ICU sliding scale protocol. Mg 1.8 now 2.3. CO2 31, BUN 8 Cr. 0.83 this am, at admission BUN 7 Cr. 0.71. Urine sodium less than 20, urine potassium 4.6, urine osmo 93. Home meds include albuterol, fluticasone, Cozaar, xanax, trazodone, Cardizem, ASA. Pt denies any hx of heavy or prolonged NSAID use. There is no history of blood or bone marrow disorders. There is no hx of jaundice or hepatitis or sexually transmitted disease. Pt has no hx of collagen vascular disease or vasculitis. No history of dysuria or frequency. She did have a CT at outlying hospital with IV contrast 6/22/19. There is no hx of paraprotein disease. No hx of recurrent UTI , incontinence or recurrent nephrolithiasis.      Past Medical History:        Diagnosis Date    Hypertension     IBS (irritable bowel syndrome)     Lumbago     Lumbar neuritis     Lumbar spondylosis     [facility]up[facility]     Osteoarthritis     PTSD (post-traumatic stress disorder)     Ulcerative colitis ([facility]        Past Surgical History:    History reviewed. No pertinent surgical history. Current Medications:      dextrose 5 % solution Continuous   potassium chloride (KLOR-CON M) extended release tablet 40 mEq Once   sodium chloride flush 0.9 % injection 10 mL 2 times per day   sodium chloride flush 0.9 % injection 10 mL PRN   acetaminophen (TYLENOL) tablet 650 mg Q4H PRN   morphine (PF) injection 2 mg Q2H PRN   albuterol sulfate  (90 Base) MCG/ACT inhaler 2 puff Q6H PRN   aspirin EC tablet 81 mg Daily   diltiazem (CARDIZEM CD) extended release capsule 360 mg Daily   losartan (COZAAR) tablet 50 mg Daily   traZODone (DESYREL) tablet 50 mg Nightly   sodium chloride flush 0.9 % injection 10 mL 2 times per day   sodium chloride flush 0.9 % injection 10 mL PRN   magnesium hydroxide (MILK OF MAGNESIA) 400 MG/5ML suspension 30 mL Daily PRN   ondansetron (ZOFRAN) injection 4 mg Q6H PRN   enoxaparin (LOVENOX) injection 40 mg Daily   0.9 % sodium chloride infusion Continuous   potassium chloride 10 mEq/100 mL IVPB (Peripheral Line) PRN   magnesium sulfate 1 g in dextrose 5% 100 mL IVPB PRN       Allergies:  Propoxyphene; Sulfa antibiotics;  Tape Tony Lamprey tape]; and Vicodin [hydrocodone-acetaminophen]    Social History:   Social History     Socioeconomic History    Marital status: Unknown     Spouse name: Not on file    Number of children: Not on file    Years of education: Not on file    Highest education level: Not on file   Occupational History    Not on file   Social Needs    Financial resource strain: Not on file    Food insecurity:     Worry: Not on file     Inability: Not on file    Transportation needs:     Medical: Not on file     Non-medical: Not on file   Tobacco Use    Smoking status: Never Smoker    Smokeless tobacco: Never Used   Substance and Sexual Activity    Alcohol use: Not on file   [facility] Patient Vitals for the past 96 hrs (Last 3 readings):   Weight   06/23/19 0000 179 lb 7.3 oz (81.4 kg)     Physical Exam:  General appearance:Awake, alert, in no acute distress  Skin: warm and dry, no rash +dry mucous membranes  Eyes: conjunctivae normal and sclera anicteric  ENT: :no thrush no pharyngeal congestion    Neck: supple  Pulmonary: no wheezing or rhonchi. No rales heard. Cardiovascular: Normal S1 & S2,  No S3 or  S4, no Pericardial Rub no Murmur   Abdomen: softly distended, +BS, +healed scar, ttp near this without peritoneal signs, no fluid wave. Extremities:no cyanosis, clubbing or edema    Labs:   CBC:  Recent Labs     06/22/19 2020 06/23/19 0454   WBC 8.5 5.4   RBC 4.32 4.27   HGB 12.7 12.3   HCT 36.8 37.2   MCV 85.2 87.1   MCH 29.4 28.8   MCHC 34.5 33.1   RDW 13.2 13.4    358   MPV 9.1 9.1      BMP: Recent Labs     06/22/19 2020 06/22/19 2300 06/23/19 0454   * 120* 126*   K 2.6*  --  2.5*   CL 80*  --  84*   CO2 27  --  31   BUN 7*  --  8   CREATININE 0.71  --  0.83   GLUCOSE 110*  --  112*   CALCIUM 9.9  --  9.6        Phosphorus:    Recent Labs     06/22/19 2020 06/22/19  2300   PHOS 4.0 3.9     Magnesium:   Recent Labs     06/22/19 2020 06/22/19 2300 06/23/19  0454   MG 1.8 1.8 2.3     Albumin:   Recent Labs     06/22/19 2020 06/23/19  0454   LABALBU 4.2 4.0       IRON:  No results found for: IRON  Iron Saturation:  No components found for: PERCENTFE  TIBC:  No results found for: TIBC  FERRITIN:  No results found for: FERRITIN  SPEP: Lab Results   Component Value Date    PROT 6.9 06/23/2019     UPEP: No results found for: TPU     C3: No results found for: C3  C4: No results found for: C4  MPO ANCA:  No results found for: MPO .   PR3 ANCA:  No results found for: PR3  Urine Sodium:    Lab Results   Component Value Date    FARRAH <20 06/23/2019      Urine Creatinine:  Lab Results   Component Value Date    LABCREA 33.7 06/23/2019     Urine Eosinophils: No results found for: HTN  6. Crohn's  7. Lupus. 8. Nausea vomiting    Plan:  1. Continue to check BMP every 4 hour  2. D5% at 75cc/hr she was to protect sodium from over correcting. 3. When able to have PO per general surgery, will need 1500cc fluid restriction. 4. Try not to raise the sodium by more than 6-8 in 24 hours. 5. Will follow. Thank you for the consultation. Please do not hesitate to call with questions. Electronically signed by SUZANNE Watson on 6/23/2019 at 9:23 AM      Attending Physician Statement  I have discussed the care of this patient, including pertinent history and exam findings, with the Resident/CNP. I have reviewed the key elements of all parts of the encounter with the Resident/CNP. I agree with the assessment, plan and orders as documented by the Resident/CNP. Norbert Vance MD   Nephrology [address] [place] [address]

## 2019-06-23 NOTE — ED NOTES
Pt back in bed, repositioned. Lights dimmed for patient comfort. Pt denies any other needs at this time. Will continue to monitor.       Boni Cabot, RN  06/22/19 4061

## 2019-06-23 NOTE — FLOWSHEET NOTE
Assessment: Pt was asleep in dark room with lights out and curtain closed.  No family were bedside  Intervention:  provided ministry of presence  Outcome: Chaplains will remain available for spiritual care and support     06/23/19 0830   Encounter Summary   Services provided to: Patient not available   Referral/Consult From: [facility] Unknown   [facility] Unknown   Continue Visiting   (6/23/19)   Complexity of Encounter Low   Length of Encounter 15 minutes

## 2019-06-23 NOTE — ED PROVIDER NOTES
[facility]  Emergency Department  Faculty Attestation     I performed a history and physical examination of the patient and discussed management with the resident. I reviewed the residents note and agree with the documented findings and plan of care. Any areas of disagreement are noted on the chart. I was personally present for the key portions of any procedures. I have documented in the chart those procedures where I was not present during the key portions. I have reviewed the emergency nurses triage note. I agree with the chief complaint, past medical history, past surgical history, allergies, medications, social and family history as documented unless otherwise noted below. For Physician Assistant/ Nurse Practitioner cases/documentation I have personally evaluated this patient and have completed at least one if not all key elements of the E/M (history, physical exam, and MDM). Additional findings are as noted. Primary Care Physician:  No primary care provider on file. Screenings:  [unfilled]    CHIEF COMPLAINT       Chief Complaint   Patient presents with    Abdominal Pain       RECENT VITALS:   Temp: 98.2 °F (36.8 °C),  Pulse: 107, Resp: 18, BP: (!) 142/85    LABS:  Labs Reviewed - No data to display    Radiology  No orders to display         Attending Physician Additional  Notes    Transferred from [address] [place] [address] with small bowel obstruction, vomiting, dehydration, hyponatremia and hypokalemia, for general surgery. Prior abdominal surgery including right lower quadrant hernia repair with mesh. She is had multiple episodes of vomiting. She has no fever chills or sweats. She had hypokalemia treated with oral and IV potassium as well as IV magnesium. She had IV fluids for her hyponatremia. NG tube to suction which has brown material.  On exam she is uncomfortable hypertensive tachycardic afebrile.   Abdomen is slightly distended and relatively soft with exception of right lower quadrant where there is firmness and localized tenderness. Plan is IV saline, potassium replacement, cardiac monitoring, antiemetics, analgesics, surgical consultation, review of films, admission. Milagro Evans.  Isra Mccoy MD, [facility]  Attending Emergency  Physician                Edinson Zambrano MD  06/22/19 9123

## 2019-06-23 NOTE — CONSULTS
Consult    PATIENT NAME: Ciera Mehta  AGE: 79 y.o. MEDICAL RECORD NO. [id number]  DATE: 6/22/2019  SURGEON: Dr Mack Stanford: Krystin Frances., APRN - CNP    Patient evaluated at the request of  Dr. Margarette Tariq  Reason for evaluation: SOB    Patient information was obtained from patient and relative(s). History/Exam limitations: none. Patient presented to the Emergency Department as a transfer from [address] [place] [address] Avenue:   1. Abdominal pain with radiographic evidence of SBO      PLAN:   1. Medicine to admit  2. Follow up repeat labs  3. Continue NGT to LIS  4. Serial exams  5. Will proceed with nonoperative management at this time      HISTORY:   History of Chief Complaint:    Ciera Mehta is a 79 y.o. female with hx of Crohn's disease s/p exploratory laparotomy, right hemicolectomy, ventral hernia repair with history of bowel obstructions who presents as a transfer from [address] [place] [address] with abdominal pain, nausea, vomiting. Four day duration. Patuient states she has had abdominal pain along the lower abdomen, worse on the right. Associated nausea and vomitiing, states it is bilious. Denies blood. No BM for 4 days. Patient reports also tactile fever and chills. States recent colonoscopy performed at [facility] with no abnormalities noted. Denies chest pain. Chronic SOB from alpha 1 AT deficiency. Past Medical History   has a past medical history of Hypertension, IBS (irritable bowel syndrome), Lumbago, Lumbar neuritis, Lumbar spondylosis, Lupus ([address] Osteoarthritis, PTSD (post-traumatic stress disorder), and Ulcerative colitis ([facility] [address] Past Surgical History   has no past surgical history on file. Medications  Prior to Admission medications    Medication Sig Start Date End Date Taking?  Authorizing Provider   UMECLIDINIUM-VILANTEROL IN Inhale into the lungs   Yes Historical Provider, MD   cyanocobalamin 1000 MCG tablet Take 1,000 mcg by mouth daily   Yes Historical Provider, MD albuterol sulfate  (90 Base) MCG/ACT inhaler Inhale 2 puffs into the lungs every 6 hours as needed for Wheezing   Yes Historical Provider, MD   FLUTICASONE FUROATE IN Inhale into the lungs   Yes Historical Provider, MD   losartan (COZAAR) 50 MG tablet Take 50 mg by mouth daily   Yes Historical Provider, MD   Esomeprazole Magnesium 20 MG TBEC Take by mouth   Yes Historical Provider, MD   ALPRAZolam (XANAX) 0.5 MG tablet Take 0.5 mg by mouth nightly as needed for Sleep. Yes Historical Provider, MD   traZODone (DESYREL) 50 MG tablet Take 50 mg by mouth nightly   Yes Historical Provider, MD   vitamin D (CHOLECALCIFEROL) 5000 units CAPS capsule Take 5,000 Units by mouth daily   Yes Historical Provider, MD   diltiazem (CARDIZEM CD) 300 MG extended release capsule Take 360 mg by mouth daily   Yes Historical Provider, MD   aspirin 81 MG tablet Take 81 mg by mouth daily   Yes Historical Provider, MD    Scheduled Meds:   sodium chloride  1,000 mL Intravenous Once    butamben-tetracaine-benzocaine  1 spray Topical Once    potassium chloride  10 mEq Intravenous Once     Continuous Infusions:  PRN Meds:. Allergies  is allergic to propoxyphene; sulfa antibiotics; tape [adhesive tape]; and vicodin [hydrocodone-acetaminophen]. Family History  family history is not on file. Social History   has no tobacco history on file. has no alcohol history on file. has no drug history on file. Review of Systems  General Positive chills  HEENT  Denies any diplopia, tinnitus or vertigo  Resp Positive SOB  Cardiac Denies any chest pain, palpitations, claudication or edema  GI Denies any melena, hematochezia, hematemesis   Denies any frequency, urgency, hesitancy or incontinence  Heme Positive easy bruising  Endocrine Denies any history of diabetes  Neuro Denies any focal motor or sensory deficits    PHYSICAL:   VITALS:  oral temperature is 98.2 °F (36.8 °C). Her blood pressure is 142/85 (abnormal) and her pulse is 107.  Her recommendations to follow. Yadi Kearney  6/22/2019, 8:55 PM      Trauma, Emergency and Critical Surgical Services  Attending Note      I have reviewed the above TECSS note(s) and confirmed the key elements of the medical history and physical exam. I have discussed the findings, established the care plan and recommendations with Resident, TECSS RN, bedside nurse. Patient seen and examined in the ED. Scans and labs reviewed. Admit to medicine, correct lytes. Serial labs, abd films, exams. NG decompression. Possible laparotomy/SBFT if no improvement. Reviewed with patient and family.     Omar Velazquez MD  6/22/2019  9:33 PM

## 2019-06-24 ENCOUNTER — APPOINTMENT (OUTPATIENT)
Dept: GENERAL RADIOLOGY | Age: 69
DRG: 389 | End: 2019-06-24
Payer: MEDICARE

## 2019-06-24 PROBLEM — E87.6 HYPOKALEMIA: Status: ACTIVE | Noted: 2019-06-24

## 2019-06-24 PROBLEM — J44.9 COPD (CHRONIC OBSTRUCTIVE PULMONARY DISEASE) (HCC): Status: ACTIVE | Noted: 2019-06-24

## 2019-06-24 PROBLEM — E87.1 HYPONATREMIA: Status: ACTIVE | Noted: 2019-06-24

## 2019-06-24 PROBLEM — K52.9 IBD (INFLAMMATORY BOWEL DISEASE): Status: ACTIVE | Noted: 2019-06-24

## 2019-06-24 LAB
ABSOLUTE EOS #: 0.04 K/UL (ref 0–0.44)
ABSOLUTE IMMATURE GRANULOCYTE: 0.03 K/UL (ref 0–0.3)
ABSOLUTE LYMPH #: 1.24 K/UL (ref 1.1–3.7)
ABSOLUTE MONO #: 1.22 K/UL (ref 0.1–1.2)
ANION GAP SERPL CALCULATED.3IONS-SCNC: 13 MMOL/L (ref 9–17)
BASOPHILS # BLD: 1 % (ref 0–2)
BASOPHILS ABSOLUTE: 0.05 K/UL (ref 0–0.2)
BUN BLDV-MCNC: 8 MG/DL (ref 8–23)
BUN/CREAT BLD: ABNORMAL (ref 9–20)
CALCIUM SERPL-MCNC: 8.9 MG/DL (ref 8.6–10.4)
CHLORIDE BLD-SCNC: 86 MMOL/L (ref 98–107)
CO2: 27 MMOL/L (ref 20–31)
CREAT SERPL-MCNC: 0.71 MG/DL (ref 0.5–0.9)
DIFFERENTIAL TYPE: ABNORMAL
EOSINOPHILS RELATIVE PERCENT: 1 % (ref 1–4)
GFR AFRICAN AMERICAN: >60 ML/MIN
GFR NON-AFRICAN AMERICAN: >60 ML/MIN
GFR SERPL CREATININE-BSD FRML MDRD: ABNORMAL ML/MIN/{1.73_M2}
GFR SERPL CREATININE-BSD FRML MDRD: ABNORMAL ML/MIN/{1.73_M2}
GLUCOSE BLD-MCNC: 108 MG/DL (ref 70–99)
HCT VFR BLD CALC: 38.2 % (ref 36.3–47.1)
HEMOGLOBIN: 12.3 G/DL (ref 11.9–15.1)
IMMATURE GRANULOCYTES: 1 %
LYMPHOCYTES # BLD: 22 % (ref 24–43)
MAGNESIUM: 2.1 MG/DL (ref 1.6–2.6)
MCH RBC QN AUTO: 29.1 PG (ref 25.2–33.5)
MCHC RBC AUTO-ENTMCNC: 32.2 G/DL (ref 28.4–34.8)
MCV RBC AUTO: 90.5 FL (ref 82.6–102.9)
MONOCYTES # BLD: 22 % (ref 3–12)
NRBC AUTOMATED: 0 PER 100 WBC
PDW BLD-RTO: 13.7 % (ref 11.8–14.4)
PLATELET # BLD: 375 K/UL (ref 138–453)
PLATELET ESTIMATE: ABNORMAL
PMV BLD AUTO: 9.5 FL (ref 8.1–13.5)
POTASSIUM SERPL-SCNC: 3.1 MMOL/L (ref 3.7–5.3)
RBC # BLD: 4.22 M/UL (ref 3.95–5.11)
RBC # BLD: ABNORMAL 10*6/UL
SEG NEUTROPHILS: 53 % (ref 36–65)
SEGMENTED NEUTROPHILS ABSOLUTE COUNT: 2.95 K/UL (ref 1.5–8.1)
SODIUM BLD-SCNC: 126 MMOL/L (ref 135–144)
SODIUM BLD-SCNC: 128 MMOL/L (ref 135–144)
SODIUM BLD-SCNC: 129 MMOL/L (ref 135–144)
SODIUM BLD-SCNC: 131 MMOL/L (ref 135–144)
WBC # BLD: 5.5 K/UL (ref 3.5–11.3)
WBC # BLD: ABNORMAL 10*3/UL

## 2019-06-24 PROCEDURE — 80048 BASIC METABOLIC PNL TOTAL CA: CPT

## 2019-06-24 PROCEDURE — 99233 SBSQ HOSP IP/OBS HIGH 50: CPT | Performed by: INTERNAL MEDICINE

## 2019-06-24 PROCEDURE — 6370000000 HC RX 637 (ALT 250 FOR IP): Performed by: INTERNAL MEDICINE

## 2019-06-24 PROCEDURE — 84295 ASSAY OF SERUM SODIUM: CPT

## 2019-06-24 PROCEDURE — 6370000000 HC RX 637 (ALT 250 FOR IP): Performed by: EMERGENCY MEDICINE

## 2019-06-24 PROCEDURE — 6370000000 HC RX 637 (ALT 250 FOR IP): Performed by: STUDENT IN AN ORGANIZED HEALTH CARE EDUCATION/TRAINING PROGRAM

## 2019-06-24 PROCEDURE — 74250 X-RAY XM SM INT 1CNTRST STD: CPT

## 2019-06-24 PROCEDURE — 36415 COLL VENOUS BLD VENIPUNCTURE: CPT

## 2019-06-24 PROCEDURE — 94762 N-INVAS EAR/PLS OXIMTRY CONT: CPT

## 2019-06-24 PROCEDURE — 6360000002 HC RX W HCPCS: Performed by: INTERNAL MEDICINE

## 2019-06-24 PROCEDURE — 2500000003 HC RX 250 WO HCPCS: Performed by: STUDENT IN AN ORGANIZED HEALTH CARE EDUCATION/TRAINING PROGRAM

## 2019-06-24 PROCEDURE — 2060000000 HC ICU INTERMEDIATE R&B

## 2019-06-24 PROCEDURE — 2700000000 HC OXYGEN THERAPY PER DAY

## 2019-06-24 PROCEDURE — 85025 COMPLETE CBC W/AUTO DIFF WBC: CPT

## 2019-06-24 PROCEDURE — 6360000004 HC RX CONTRAST MEDICATION: Performed by: STUDENT IN AN ORGANIZED HEALTH CARE EDUCATION/TRAINING PROGRAM

## 2019-06-24 PROCEDURE — 83735 ASSAY OF MAGNESIUM: CPT

## 2019-06-24 PROCEDURE — 2580000003 HC RX 258: Performed by: INTERNAL MEDICINE

## 2019-06-24 PROCEDURE — 6360000002 HC RX W HCPCS: Performed by: EMERGENCY MEDICINE

## 2019-06-24 PROCEDURE — 94640 AIRWAY INHALATION TREATMENT: CPT

## 2019-06-24 RX ORDER — BUDESONIDE 3 MG/1
9 CAPSULE, COATED PELLETS ORAL DAILY
Status: DISCONTINUED | OUTPATIENT
Start: 2019-06-24 | End: 2019-06-26 | Stop reason: HOSPADM

## 2019-06-24 RX ORDER — SODIUM CHLORIDE 9 MG/ML
INJECTION, SOLUTION INTRAVENOUS CONTINUOUS
Status: DISCONTINUED | OUTPATIENT
Start: 2019-06-24 | End: 2019-06-24

## 2019-06-24 RX ORDER — ALPRAZOLAM 0.5 MG/1
0.5 TABLET ORAL 2 TIMES DAILY PRN
Status: DISCONTINUED | OUTPATIENT
Start: 2019-06-24 | End: 2019-06-26 | Stop reason: HOSPADM

## 2019-06-24 RX ADMIN — BARIUM SULFATE 60 ML: 960 POWDER, FOR SUSPENSION ORAL at 08:30

## 2019-06-24 RX ADMIN — BUDESONIDE 9 MG: 3 CAPSULE, GELATIN COATED ORAL at 17:38

## 2019-06-24 RX ADMIN — POTASSIUM BICARBONATE 40 MEQ: 782 TABLET, EFFERVESCENT ORAL at 06:29

## 2019-06-24 RX ADMIN — ALPRAZOLAM 0.5 MG: 0.5 TABLET ORAL at 22:02

## 2019-06-24 RX ADMIN — MORPHINE SULFATE 2 MG: 2 INJECTION, SOLUTION INTRAMUSCULAR; INTRAVENOUS at 07:53

## 2019-06-24 RX ADMIN — ASPIRIN 81 MG: 81 TABLET ORAL at 10:36

## 2019-06-24 RX ADMIN — Medication 10 ML: at 10:37

## 2019-06-24 RX ADMIN — POTASSIUM CHLORIDE: 149 INJECTION, SOLUTION, CONCENTRATE INTRAVENOUS at 12:12

## 2019-06-24 RX ADMIN — ALPRAZOLAM 0.5 MG: 0.5 TABLET ORAL at 10:29

## 2019-06-24 RX ADMIN — GLYCOPYRROLATE AND FORMOTEROL FUMARATE 2 PUFF: 9; 4.8 AEROSOL, METERED RESPIRATORY (INHALATION) at 20:53

## 2019-06-24 RX ADMIN — MORPHINE SULFATE 2 MG: 2 INJECTION, SOLUTION INTRAMUSCULAR; INTRAVENOUS at 17:04

## 2019-06-24 RX ADMIN — ENOXAPARIN SODIUM 40 MG: 40 INJECTION SUBCUTANEOUS at 10:36

## 2019-06-24 RX ADMIN — DILTIAZEM HYDROCHLORIDE 360 MG: 180 CAPSULE, COATED, EXTENDED RELEASE ORAL at 10:36

## 2019-06-24 RX ADMIN — POTASSIUM BICARBONATE 40 MEQ: 782 TABLET, EFFERVESCENT ORAL at 00:14

## 2019-06-24 RX ADMIN — POTASSIUM BICARBONATE 40 MEQ: 782 TABLET, EFFERVESCENT ORAL at 10:29

## 2019-06-24 RX ADMIN — IOHEXOL 300 ML: 240 INJECTION, SOLUTION INTRATHECAL; INTRAVASCULAR; INTRAVENOUS; ORAL at 08:24

## 2019-06-24 RX ADMIN — TRAZODONE HYDROCHLORIDE 50 MG: 50 TABLET ORAL at 22:02

## 2019-06-24 RX ADMIN — FAMOTIDINE 20 MG: 10 INJECTION, SOLUTION INTRAVENOUS at 10:36

## 2019-06-24 ASSESSMENT — PAIN SCALES - GENERAL
PAINLEVEL_OUTOF10: 6
PAINLEVEL_OUTOF10: 7

## 2019-06-24 ASSESSMENT — PAIN DESCRIPTION - LOCATION: LOCATION: ABDOMEN;BACK

## 2019-06-24 ASSESSMENT — PAIN DESCRIPTION - FREQUENCY: FREQUENCY: CONTINUOUS

## 2019-06-24 ASSESSMENT — PAIN DESCRIPTION - PROGRESSION: CLINICAL_PROGRESSION: NOT CHANGED

## 2019-06-24 ASSESSMENT — PAIN DESCRIPTION - DESCRIPTORS: DESCRIPTORS: CRAMPING

## 2019-06-24 ASSESSMENT — PAIN - FUNCTIONAL ASSESSMENT: PAIN_FUNCTIONAL_ASSESSMENT: PREVENTS OR INTERFERES WITH MANY ACTIVE NOT PASSIVE ACTIVITIES

## 2019-06-24 ASSESSMENT — PAIN DESCRIPTION - PAIN TYPE: TYPE: ACUTE PAIN;CHRONIC PAIN

## 2019-06-24 NOTE — DISCHARGE INSTR - COC
Continuity of Care Form    Patient Name: Sosa Thomas   :  1950  MRN:  [id number]    [facility] date:  2019  Discharge date:  2019    Code Status Order: Full Code   Advance Directives:   Advance Care Flowsheet Documentation     Date/Time Healthcare Directive Type of Healthcare Directive Copy in [address] Agent's Name Healthcare Agent's Phone Number    [phone]  Yes, patient has an advance directive for healthcare treatment  Durable power of  for health care;Living will  No, copy requested from other (See comment)  Healthcare power of  So-daughter  --  --          Admitting Physician:  Amberly Judge MD  PCP: Robina Rodriguez, APRN - CNP    Discharging Nurse: [facility], [facility]. Unit/Room#: 0416/5421-95  Discharging Unit Phone Number: [phone]    Emergency Contact:   Extended Emergency Contact Information  Primary Emergency Contact: Renetta Cabrera, [address] [place] [address] Phone: [phone]  Relation: Child  Secondary Emergency Contact: [facility] Phone: [phone]  Relation: Other    Past Surgical History:  History reviewed. No pertinent surgical history. Immunization History: There is no immunization history on file for this patient.     Active Problems:  Patient Active Problem List   Diagnosis Code    Small bowel obstruction ([facility]) K56.609    Hyponatremia E87.1    Hypokalemia E87.6    COPD (chronic obstructive pulmonary disease) ([facility]) J44.9    Microscopic colitis K52.839    Ulcerative colitis ([facility]) K51.90       Isolation/Infection:   Isolation          No Isolation            Nurse Assessment:  Last Vital Signs: BP (!) 142/71   Pulse 96   Temp 98.1 °F (36.7 °C) (Temporal)   Resp 16   Ht 5' 2\" (1.575 m)   Wt 180 lb 12.4 oz (82 kg)   SpO2 98%   BMI 33.06 kg/m²     Last documented pain score (0-10 scale): Pain Level: 8  Last Weight:   Wt Readings from Last 1 Encounters:   19 180 lb 12.4 oz (82 kg)     Mental Status: oriented, alert, coherent and logical    IV Access:  - None    Nursing Mobility/ADLs:  Walking   Independent  Transfer  Independent  Bathing  Independent  Dressing  Independent  [address]  Independent  Med Delivery   whole    Wound Care Documentation and Therapy:        Elimination:  Continence:   · Bowel: Yes  · Bladder: Yes  Urinary Catheter: None   Colostomy/Ileostomy/Ileal Conduit: No       Date of Last BM: 6/26/2019    Intake/Output Summary (Last 24 hours) at 6/26/2019 1735  Last data filed at 6/26/2019 0512  Gross per 24 hour   Intake 1589 ml   Output 1 ml   Net 1588 ml     I/O last 3 completed shifts: In: 0485 [P.O.:500; I.V.:1089]  Out: 1 [Urine:1]    Safety Concerns:     None    Impairments/Disabilities:      None    Nutrition Therapy:  Current Nutrition Therapy:   - Oral Diet:  General    Routes of Feeding: Oral  Liquids: No Restrictions  Daily Fluid Restriction: no  Last Modified Barium Swallow with Video (Video Swallowing Test): not done    Treatments at the Time of Hospital Discharge:   Respiratory Treatments: glycopyrrolate-formoterol (BEVESPI) 9-4.8 MCG/ACT inhaler 2 puff BID. Albuterol sulfate  (90 Base) MCG/ACT inhaler 2 puff Q 6 hr PRN. Oxygen Therapy:  is not on home oxygen therapy. Ventilator:    - No ventilator support    Rehab Therapies: None  Weight Bearing Status/Restrictions: No weight bearing restirctions  Other Medical Equipment (for information only, NOT a DME order):  None  Other Treatments: None    Patient's personal belongings (please select all that are sent with patient):  Glasses, shoes.     RN SIGNATURE:  Electronically signed by Tiffany Mann RN on 6/26/19 at 4:49 PM    CASE MANAGEMENT/SOCIAL WORK SECTION    Inpatient Status Date: 6/22/19    Readmission Risk Assessment Score:  Readmission Risk              Risk of Unplanned Readmission:        12           Discharging to Facility/ Agency   · Name:    [facility]  · Address:  · Phone:    [phone]  · Fax:       [phone]    Dialysis Facility (if applicable)   · Name:  · Address:  · Dialysis Schedule:  · Phone:  · Fax:    / signature: Electronically signed by Haider Jeffers RN on 6/26/19 at 5:35 PM    PHYSICIAN SECTION    Prognosis: Fair    Condition at Discharge: Stable    Rehab Potential (if transferring to Rehab): Good    Recommended Labs or Other Treatments After Discharge: BMP in a week,  please evaluate pt for skilled nursing, increasing aide services, PT/OT and SW.    Physician Certification: I certify the above information and transfer of Mehul Aguilar  is necessary for the continuing treatment of the diagnosis listed and that she requires Home Care for greater 30 days.      Update Admission H&P: Changes in H&P as follows - small bowel partial obstruction resolved with conservative management, electrolytes abnormalities resolved     PHYSICIAN SIGNATURE:  Electronically signed by Veronica Serna MD on 6/26/19 at 4:59 PM

## 2019-06-24 NOTE — PROGRESS NOTES
General surgery was consulted for small bowel obstruction. Patient is now able to tolerate a regular diet. No further surgery intervention indicated or planned at this time. We will sign off at this time. Please reconsult/call if additional questions, concerns, or issues develop requiring further surgery input. Thank you for this interesting consult.     Electronically signed by Rogelio Huddleston MD on 6/24/2019 at 6:20 PM

## 2019-06-24 NOTE — PROGRESS NOTES
rheumatologist but whenever she has the appointment she is not able to follow-up as she is sick. She has not seen her rheumatologist in the last few months. He denies history of smoking, alcohol, drug. The patient was here in the ED, labs were concerning for hypokalemia and hyponatremia. Sodium was 1117, corrected to 121 in the ER as patient was given 1 L bolus of fluids in the ER. He was admitted to ICU, sodium was checked every 4 hours. Nephrology was consulted. Small bowel obstruction, patient was initially n.p.o. no surgical intervention was planned by general surgery as patient did not have any nausea overnight in the ICU. NG tube was placed    For the hyponatremia, initially patient was started on D5 by nephrology at 75 cc/h to protect the overcorrection. Initially urine sodium was less than 20, urine osmolarity was only 93. As per nephrology patient hyponatremia likely related to significant free water intake. Today, SBFT was done. Patient diet was restarted. Patient was able to tolerate some jell o and soup. Physical Exam:   Vitals: /70   Pulse 86   Temp 97.8 °F (36.6 °C) (Oral)   Resp 16   Ht 5' 2\" (1.575 m)   Wt 164 lb 10.9 oz (74.7 kg)   SpO2 97%   BMI 30.12 kg/m²   24 hour intake/output:    Intake/Output Summary (Last 24 hours) at 6/24/2019 1602  Last data filed at 6/24/2019 0600  Gross per 24 hour   Intake 152 ml   Output 3000 ml   Net -2848 ml     Last 3 weights: Wt Readings from Last 3 Encounters:   06/24/19 164 lb 10.9 oz (74.7 kg)       General appearance: alert and cooperative with exam  HEENT: Head: Normocephalic, no lesions, without obvious abnormality.   Neck: no adenopathy, no carotid bruit, no JVD, supple, symmetrical, trachea midline and thyroid not enlarged, symmetric, no tenderness/mass/nodules  Lungs: clear to auscultation bilaterally  Heart: regular rate and rhythm, S1, S2 normal, no murmur, click, rub or gallop  Abdomen: soft, slightly distended, tender, the last 72 hours. HgbA1C: No results for input(s): LABA1C in the last 72 hours. INR: No results for input(s): INR in the last 72 hours. Hepatic:   Recent Labs     06/23/19  0454   ALKPHOS 71   ALT 14   AST 19   PROT 6.9   BILITOT 0.43   LABALBU 4.0     Amylase and Lipase:No results for input(s): LACTA, AMYLASE in the last 72 hours. Lactic Acid: No results for input(s): LACTA in the last 72 hours. CARDIAC ENZYMES:No results for input(s): CKTOTAL, CKMB, CKMBINDEX, TROPONINI in the last 72 hours. BNP: No results for input(s): BNP in the last 72 hours. Lipids: No results for input(s): CHOL, TRIG, HDL, LDLCALC in the last 72 hours. Invalid input(s): LDL  ABGs: No results found for: PH, PCO2, PO2, HCO3, O2SAT  Thyroid: No results found for: TSH     Urinalysis:   Recent Labs     06/23/19  0038   BACTERIA NOT REPORTED   COLORU YELLOW   PHUR 7.0   PROTEINU NEGATIVE   RBCUA 0 TO 2   SPECGRAV 1.004*   BILIRUBINUR NEGATIVE   NITRU NEGATIVE   WBCUA None   LEUKOCYTESUR NEGATIVE   GLUCOSEU NEGATIVE     Small bowel follow through--  Abnormally dilated proximal and mid small bowel loops with normal caliber   distal small bowel loops noted along with normal small bowel transit time. Findings indicative of low grade partial small bowel obstruction.          Assessment:  Patient Active Problem List   Diagnosis    Small bowel obstruction (HCC)    Hyponatremia    Hypokalemia    IBD (inflammatory bowel disease)    COPD (chronic obstructive pulmonary disease) (HCC)           Plan:  Partial Small bowel obstruction  Ng tube removed  Diet clear liquid  No acute intervention as per gen surg  Small bowel follow through show low grade partial small bowel obstruction  Iv pepcid    Hyponatremia  Sodium checks q6  Started on Normal saline @ 60ml/hr    Hypokalemia  Potassium in iv fluid  Supplement additional as per the labs    Microscopic colitis  Start budenoside 9 mg daily for 8 weeks  Follow GI recommendations    Hypertension  Continue cardizem 360mg, losartan 50mg    COPD  Continue bevespi    PT/OT  DVT prophylaxis- lovenox  Diet- clear liquid, progress as tolerated   for discharge plan     [facility] [address], PGY-1  [address] [place], [place]  6/14/2944,2:70 PM

## 2019-06-24 NOTE — PROGRESS NOTES
Critical Care Attending Physician Addendum:    I have personally seen and examined Juan Pablo Cooley with the resident and the key elements of all parts of the encounter were performed by me. Patient was reassessed on more than one occasion, when required. I reviewed the interval history, interpreted all available radiographic, laboratory and physiologic data at the time of service. I agree with the assessment and plan as documented by resident with following amendments. Patient with previous abdominal surgeries including bowel resection and subsequent hernia repair now admitted with complaints of nausea/vomiting for last 3 days and constipation for 5 days. CT scan done at [address] [place] [address] was suggestive of small bowel obstruction. Patient currently has NG tube in place, surgery has been consulted, want to continue with conservative management at this point. Patient was hyponatremic at outside hospital, sodium 121 in the emergency room, status post 1 L fluid bolus. Continue supportive care, monitor electrolytes, I/O, potassium replacement. She continues to be hemodynamically stable. The patient is critically ill with illness/injury that acutely impairs one or more vital organ systems, such that there is a high probability of imminent or life threatening deterioration in the patient's condition. Critical care time (excluding procedures) of more than 30 minutes was spent, in coordination of care during bedside rounds and discussion of patient care in detail, and recommendations of the team were adopted in the plan. Additionally, the necessity of all invasive devices was reviewed. Cheyenne Hugo M.D.   Pulmonary and Critical Care Medicine

## 2019-06-24 NOTE — PROGRESS NOTES
Smoking Cessation - topics covered   []  Health Risks  []  Benefits of Quitting   []  Smoking Cessation  [x]  Patient has no history of tobacco use  []  Patient is former smoker. [x]  No need for tobacco cessation education. []  Booklet given  []  Patient verbalizes understanding. []  Patient denies need for tobacco cessation education. []  Unable to meet with patient today. Will follow up as able.   Buck Garcia  8:00 AM

## 2019-06-24 NOTE — PROGRESS NOTES
Critical care team - Resident sign-out to medicine service      Date and time: 6/24/2019 11:46 AM  Patient's name:  Sapphire Agarwal  Medical Record Number: [id number]  Patient's account/billing number: [de-identified]  Patient's YOB: 1950  Age: 79 y.o. Date of Admission: 6/22/2019  7:40 PM  Length of stay during current admission: 2    Primary Care Physician: EBER Elias CNP    Code Status: Full Code    Mode of physician to physician communication:        [x] Via telephone   [] In person     Date and time of sign-out: 6/24/2019 11:46 AM    Accepting Internal Medicine resident: Dr. Roderick Jones    Accepting Medicine team: IM Team 1    Accepting team's attending: Dr. Andrea Sarkar    Patient's current ICU Bed:  128     Patient's assigned bed on floor:  419        [] Med-Surg Monitored [x] Step-down       [] Psychiatry ICU       [] Psych floor     Reason for ICU admission:     History was obtained from chart review and the patient. Sapphire Agarwal is a 79 y.o. who presents as a transfer to the ER from [facility]. Patient came in with a small bowel obstruction that was seen on the CT imaging at [facility]. She has been having significant nausea vomitting over the last 3 days with minimal PO intake. Says that she has been having significant abdominal distension as well. No fevers, chills. No diarrhea, last bowel movement was 5 days ago. No flatus for 4 days. Hx of 2 previous abdominal surgeries including a hernia repair and a bowel resection hx. Hx of crohns and lupus disease as well. Distension significantly better once the NG tube was placed. General surgery on board as well. Outside hospital labs were concerning for hypokalemia, and hyponatremia with a sodium of 117 at [facility]. Sodium was 121 in the ER here, she was given a 1 L bolus of fluids in the ER.          ICU course summary:     SBFT per gen surg, pt with loose stools, gen surg ok to adv diet     Procedures during patient's ICU stay:     SBFT    Current Vitals:     /84   Pulse 97   Temp 98.1 °F (36.7 °C) (Oral)   Resp 19   Ht 5' 2\" (1.575 m)   Wt 164 lb 10.9 oz (74.7 kg)   SpO2 100%   BMI 30.12 kg/m²       Cultures:     Blood cultures:                 [] None drawn      [] Negative             []  Positive (Details:  )  Urine Culture:                   [] None drawn      [] Negative             []  Positive (Details:  )  Sputum Culture:               [] None drawn       [] Negative             []  Positive (Details:  )   Endotracheal aspirate:     [] None drawn       [] Negative             []  Positive (Details:  )       Consults:     1. Nephro, GI, Gen surg    Assessment:     Patient Active Problem List    Diagnosis Date Noted    Small bowel obstruction ([facility] [address] 06/22/2019     80 yo female for SBO, hyponatremia, and hypokalemia     1. Neuro- monitor, pain control PRN  2. CV/Resp- asa 81mg, cardizem 360mg daily, losartan 50mg, monitor, resumed home inhaler   3. GI- NPO, NGT to LIWS, serial abd exams, pepcid, General surgery following  4. Renal- Sodium Q4hrs, Fluid restriction < 1500 ml, Nephrology following for hyponatremia, Potassium sliding scale orders, Try not to raise the sodium by more than 6-8 in 24 hours, NaCL 30mEq in .9% 60ml/hr  5. Heme/ID- monitor, lovenox for DVT Ppx  6. Endo- jennifer blood glucose   7. Msk- encourage ambulation, scds   8. Dispo- await results of SBFT and appreciate neprho recs    Above mentioned assessment and plan was discussed by me with the admitting medicine resident. The medicine team assigned to the patient by medicine admitting resident will be following up the patient from now onwards on the floor.      Electronically signed by Lisa Benitez DO on 6/24/2019 at 11:46 AM

## 2019-06-24 NOTE — CONSULTS
[address] [facility] CONSULT    Patient:   Ana Marie   :    1950   Facility:   [address] [place] [address]   Date:    2019  Admission Dx:  Small bowel obstruction [facility]) [U53.207]  Requesting physician: Era Sandifer, MD  Reason for consult:  Crohn's  management       SUBJECTIVE     HISTORY OF PRESENT ILLNESS  This is a 79 y.o. [address] [facility]  female who was admitted 2019 with Small bowel obstruction ([facility] [address] [K56.609]. We have been asked to see the patient in consultation by Era Sandifer, MD for Crohn's management. Patient presents as transfer from [facility].  She presented there with complaints of N/V, abdominal distention and abdominal pain. CT A/P showed moderate severity small bowel obstruction secondary to adhesions from mesh in ventral abdomen. Same pattern as obstruction seen on 2018. Labs showed severe hyponatremia. She was transferred for further care. 78 y/o female with hx of IBS, ? IBD, ? Crohn's disease (Listed on chart as UC) s/p exploratory lap, right hemicolectomy, ventral hernia repair with history of bowel obstruction. Patient states she has hx of diarrhea and had colonoscopy completed this year by Dr. Rico Phillips [facility]) and dx with Crohn's. She states being on steroids  but gained 40 lbs and wishes not to take them again. She states she was recently prescribed Viberzi and steroids, but did not fill them. Patient reports having ~ 10 episodes of diarrhea today. Tolerating CL diet      OBJECTIVE:     PAST MEDICAL/SURGICAL HISTORY  Past Medical History:   Diagnosis Date    Hypertension     IBS (irritable bowel syndrome)     Lumbago     Lumbar neuritis     Lumbar spondylosis     Lupus (HCC)     Osteoarthritis     PTSD (post-traumatic stress disorder)     Ulcerative colitis ([facility] [address]      History reviewed. No pertinent surgical history.     ALLERGIES:  Allergies   Allergen Reactions    Propoxyphene  Sulfa Antibiotics     Tape Van Rochehold Tape]     Vicodin [Hydrocodone-Acetaminophen]        HOME MEDICATIONS:  Prior to Admission medications    Medication Sig Start Date End Date Taking? Authorizing Provider   UMECLIDINIUM-VILANTEROL IN Inhale into the lungs   Yes Historical Provider, MD   cyanocobalamin 1000 MCG tablet Take 1,000 mcg by mouth daily   Yes Historical Provider, MD   albuterol sulfate  (90 Base) MCG/ACT inhaler Inhale 2 puffs into the lungs every 6 hours as needed for Wheezing   Yes Historical Provider, MD   FLUTICASONE FUROATE IN Inhale into the lungs   Yes Historical Provider, MD   losartan (COZAAR) 50 MG tablet Take 50 mg by mouth daily   Yes Historical Provider, MD   Esomeprazole Magnesium 20 MG TBEC Take by mouth   Yes Historical Provider, MD   ALPRAZolam (XANAX) 0.5 MG tablet Take 0.5 mg by mouth nightly as needed for Sleep.    Yes Historical Provider, MD   traZODone (DESYREL) 50 MG tablet Take 50 mg by mouth nightly   Yes Historical Provider, MD   vitamin D (CHOLECALCIFEROL) 5000 units CAPS capsule Take 5,000 Units by mouth daily   Yes Historical Provider, MD   diltiazem (CARDIZEM CD) 300 MG extended release capsule Take 360 mg by mouth daily   Yes Historical Provider, MD   aspirin 81 MG tablet Take 81 mg by mouth daily   Yes Historical Provider, MD       CURRENT MEDICATIONS:  Scheduled Meds:   glycopyrrolate-formoterol  2 puff Inhalation BID    haloperidol lactate  2 mg Intravenous Once    famotidine (PEPCID) injection  20 mg Intravenous Daily    sodium chloride flush  10 mL Intravenous 2 times per day    aspirin  81 mg Oral Daily    diltiazem  360 mg Oral Daily    losartan  50 mg Oral Daily    traZODone  50 mg Oral Nightly    sodium chloride flush  10 mL Intravenous 2 times per day    enoxaparin  40 mg Subcutaneous Daily     Continuous Infusions:   IV infusion builder 60 mL/hr at 06/24/19 1212     PRN Meds:ALPRAZolam, sodium chloride flush, acetaminophen, morphine, albuterol sulfate HFA, sodium chloride flush, magnesium hydroxide, ondansetron, potassium chloride, magnesium sulfate    SOCIAL HISTORY:     Tobacco:   reports that she has never smoked. She has never used smokeless tobacco.  Alcohol:   has no alcohol history on file. Illicit drugs:  has no drug history on file. FAMILY HISTORY:     History reviewed. No pertinent family history. REVIEW OF SYSTEMS:    Constitutional: No fever, no chills, no lethargy, no weakness. HEENT:  No headache, otalgia, itchy eyes, nasal discharge or sore throat. Cardiac:  No chest pain, dyspnea, orthopnea or PND. Chest:   No cough, phlegm or wheezing. Abdomen:  + abdominal pain, + nausea and vomiting, + diarrhea  Neuro:  No focal weakness, abnormal movements or seizure like activity. Skin:   No rashes, no itching. :   No hematuria, no pyuria, no dysuria, no flank pain. Extremities:  No swelling or joint pains. ROS was otherwise negative except as mentioned in the [address] PHYSICAL EXAM:    /77   Pulse 86   Temp 97.9 °F (36.6 °C)   Resp 16   Ht 5' 2\" (1.575 m)   Wt 164 lb 10.9 oz (74.7 kg)   SpO2 95%   BMI 30.12 kg/m²     GENERAL:   Well developed, Well nourished, No apparent distress  HEAD:   Normocephalic, Atraumatic  EENT:   EOMI, Sclera not icteric, Oropharynx moist   NECK:   Supple, Trachea midline  LUNGS:  CTA Bilaterally  HEART:  RRR, No murmur  ABDOMEN:   Soft, tender, Nondistended, BS present  EXT:   No clubbing. No cyanosis. No edema. SKIN:   No rashes. No jaundice. No stigmata of liver disease.     MUSC/SKEL:   Adequate muscle bulk for patient's age, No significant synovitis, No deformities  NEURO:  A&O x Three, CN II- XII grossly intact      LABS AND IMAGING:     CBC  Recent Labs     06/22/19  2020 06/23/19  0454 06/24/19  0509   WBC 8.5 5.4 5.5   HGB 12.7 12.3 12.3   HCT 36.8 37.2 38.2   MCV 85.2 87.1 90.5   MCH 29.4 28.8 29.1   MCHC 34.5 33.1 32.2    358 375       BMP  Recent Labs 06/22/19 2020 06/23/19  0454 06/23/19  1203 06/23/19  1836  06/24/19  0509 06/24/19  0757 06/24/19  1143   *   < > 126* 126* 120*   < > 126* 129* 131*   K 2.6*  --  2.5* 2.8* 3.3*  --  3.1*  --   --    CL 80*  --  84*  --   --   --  86*  --   --    CO2 27  --  31  --   --   --  27  --   --    BUN 7*  --  8  --   --   --  8  --   --    CREATININE 0.71  --  0.83  --   --   --  0.71  --   --    GLUCOSE 110*  --  112*  --   --   --  108*  --   --    CALCIUM 9.9  --  9.6  --   --   --  8.9  --   --     < > = values in this interval not displayed. LFTS  Recent Labs     06/22/19 2020 06/23/19 0454   ALKPHOS 73 71   ALT 15 14   AST 21 19   PROT 7.0 6.9   BILITOT 0.55 0.43   LABALBU 4.2 4.0       AMYLASE/LIPASE/AMMONIA  No results for input(s): AMYLASE, LIPASE, AMMONIA in the last 72 hours. PT/INR  No results for input(s): PROTIME, INR in the last 72 hours. ANEMIA STUDIES  No results for input(s): IRON, LABIRON, TIBC, UIBC, FERRITIN, FVBYHEKN32, FOLATE, OCCULTBLD in the last 72 hours. IMAGING      CT abdomen and pelvis 06/22/2019 - [place]      Xr Abdomen (kub) (single Ap View)    Result Date: 6/23/2019  EXAMINATION: ONE SUPINE XRAY VIEW(S) OF THE ABDOMEN 6/23/2019 5:40 am COMPARISON: None. HISTORY: ORDERING SYSTEM PROVIDED HISTORY: SBO TECHNOLOGIST PROVIDED HISTORY: SBO Ordering Physician Provided Reason for Exam: portable supine, SBO Acuity: Unknown Type of Exam: Unknown FINDINGS: Cardiac monitoring leads overlie the chest.  Enteric tube projects in the upper abdomen possibly within the gastric body. Nonspecific bowel gas pattern. No dilated air-filled loops of bowel. Stool in the ascending colon. Patchy air throughout the transverse colon and descending colon. Pelvic phleboliths. Arthritic changes bilateral hips. SI joint degenerative changes. Nonspecific bowel gas pattern with presence of distal gas. And NG tube projects in the upper abdomen likely in the gastric body.      Fl Small Bowel Follow Through Only    Result Date: 6/24/2019  EXAMINATION: SMALL BOWEL FOLLOW THROUGH SERIES 6/24/2019 TECHNIQUE: Small bowel follow through series was performed with overhead images and spot images. FLUOROSCOPY DOSE AND TYPE OR TIME AND EXPOSURES: DAP 0Gycm2. No fluoroscopy was performed. COMPARISON: None HISTORY: ORDERING SYSTEM PROVIDED HISTORY: sbo TECHNOLOGIST PROVIDED HISTORY: sbo Ordering Physician Provided Reason for Exam: ? small bowel obst./300ml Omnipaque thru NG Acuity: Unknown Type of Exam: Unknown FINDINGS:  abdominal image demonstrates NG tube in place. Gas is seen in small bowel large bowel. Some borderline dilated right lower quadrant small bowel loops noted. After administration of contrast, serial imaging of the GI tract was performed with some images acquired in the department and others portable in the patient's room. Study shows multiple dilated small bowel loops in the proximal and mid abdomen with normal caliber loops in the left lower quadrant. Small bowel transit time is normal.  Findings consistent with low grade mid small bowel obstruction. Abnormally dilated proximal and mid small bowel loops with normal caliber distal small bowel loops noted along with normal small bowel transit time. Findings indicative of low grade partial small bowel obstruction. The findings were sent to the Radiology Results [address] at 12:23 pm on 6/24/2019to be communicated to a licensed caregiver. IMPRESSION:     80 y/o female with hx of IBS, ? IBD, ? Crohn's disease (Listed on chart as UC) s/p exploratory lap, right hemicolectomy, ventral hernia repair with history of bowel obstruction. Patient presents as transfer from [facility].  She presented there with complaints of N/V, abdominal distention and abdominal pain. CT A/P showed moderate severity small bowel obstruction secondary to adhesions from mesh in ventral abdomen.  Same pattern as obstruction seen on 06/25/2018. Labs showed severe hyponatremia. GI consulted for management of Crohn's. Dr. Phi Henley spoke with Dr. Makenna Malone who reported patient had colonoscopy completed in May 2019 with colonoscopy showing no endoscopic abnormality and path showing microscopic/lymphocytic colitis. 1. IBD- **Based on conversation with Dr. Makenna Malone, patient DOES NOT HAVE endoscopic or pathological evidence of IBD. Patient has microscopic colitis. - Patient will need tx with steroids or Bismuth. Patient wishes to start treatment with locally selective steroids.   (above discussed at length with patient and daughter)     2. SBO secondary to adhesions related to mesh (per CT results)     3. Chronic Diarrhea -  Secondary to microscopic colitis    Old records, labs and imaging reviewed. PLAN   1. Start Budesonide 9 mg daily x 8 weeks  2. SBO management and diet per general surgery  3. Patient will follow up with established gastroenterologist Dr. Makenna Malone post discharge  4. Would try to avoid antidiarrheal medicine with history of SBO  5. Please call GI with any questions, concerns or acute change in clinical status      This plan was formulated in collaboration with Dr. Phi Henley  Thank you for allowing us to participate in the care of your patient. Electronically signed by: Sienna SUTTON on 6/24/2019 at 2:46 PM     Please note that this note was generated using a voice recognition dictation software. Although every effort was made to ensure the accuracy of this automated transcription, some errors in transcription may have occurred.

## 2019-06-24 NOTE — PROGRESS NOTES
PROGRESS NOTE    PATIENT NAME: Aura Huston  MEDICAL RECORD NO. [id number]  DATE: 2019  PRIMARY CARE PHYSICIAN: Radha Arredondo., APRN - CNP    HD: # 2    ASSESSMENT    Patient Active Problem List   Diagnosis    Small bowel obstruction [facility])       MEDICAL DECISION MAKING AND PLAN    1. Continue medical management per primary  2. NPO with NGT to LIS  3. Will obtain SBFT today  4. IVF  5. I/Os  6. Replace electrolytes; hypokalemia and hyponatremia  7. Continue medical management, patient soft and nondistended this morning, Pt had no nausea overnight, minimal NG output; no acute surgical intervention at this time, will continue to monitor closely. Further recs to follow SBFT    SUBJECTIVE    Aura Huston was seen and examined this morning. Claims she is feeling about the same as yesterday. She denies any nausea or emesis overnight. Minimal output from NGT. Claims abd pain is improved. +flatus/-BM. NPO. Has been out of bed to void without difficulty. OBJECTIVE  VITALS: Temp: Temp: 98.1 °F (36.7 °C)Temp  Av.2 °F (36.8 °C)  Min: 97.9 °F (36.6 °C)  Max: 98.4 °F (54.5 °C) BP Systolic (28KXW), BLF:064 , Min:82 , AFA:893   Diastolic (16SSW), APT:13, Min:53, Max:98   Pulse Pulse  Av.5  Min: 85  Max: 102 Resp Resp  Av  Min: 10  Max: 23 Pulse ox SpO2  Av %  Min: 89 %  Max: 99 %  GENERAL: alert, no distress  LUNGS: no increased WOB  HEART: RRR  ABDOMEN: soft, ND, tender to palpation near bottom of previous scar where umbilicus would be; no guarding, non peritoneal; previous healed surgical scar  EXTERMITY: no cyanosis, clubbing or edema    I/O last 3 completed shifts: In: 152 [I.V.:152]  Out: 3220 [Urine:2870; Emesis/NG output:350]    Drain/tube output:   In: 152 [I.V.:152]  Out: 3000 [Urine:2650]    LAB:  CBC:   Recent Labs     19  0454 19  0509   WBC 8.5 5.4 5.5   HGB 12.7 12.3 12.3   HCT 36.8 37.2 38.2   MCV 85.2 87.1 90.5    358 375     BMP: Recent Labs     06/22/19 2020 06/23/19  0454 06/23/19  1203 06/23/19  1836 06/23/19  2230 06/24/19  0509   *   < > 126* 126* 120* 125* 126*   K 2.6*  --  2.5* 2.8* 3.3*  --  3.1*   CL 80*  --  84*  --   --   --  86*   CO2 27  --  31  --   --   --  27   BUN 7*  --  8  --   --   --  8   CREATININE 0.71  --  0.83  --   --   --  0.71   GLUCOSE 110*  --  112*  --   --   --  108*    < > = values in this interval not displayed. COAGS:   Recent Labs     06/22/19 2020 06/23/19 0454   PROT 7.0 6.9       RADIOLOGY:  KUB   Impression:     Nonspecific bowel gas pattern with presence of distal gas. And NG tube projects in the upper abdomen likely in the gastric body. Toma Elam DO  6/23/19, 7:59 AM       Trauma, Emergency and Critical Surgical Services  Attending Note      I have reviewed the above TECSS note(s) and confirmed the key elements of the medical history and physical exam. I have discussed the findings, established the care plan and recommendations with Resident, TECSS RN, bedside nurse. Seen and examined by me this am.  Minimal flatus. Will pursue UGI/SBFT. Labs reviewed. Critical care correcting ingrid.     Carrie Gu MD  6/24/2019  10:26 AM

## 2019-06-24 NOTE — PROGRESS NOTES
Nephrology Progress Note      SUBJECTIVE      She is seen in follow-up of hyponatremia. 68-year-old lady with a history of inflammatory bowel disease, transferred from [facility] wherein she had presented because of intermittent nausea abdominal distention, abdominal pain and was noted to have small bowel obstruction on CT. Patient tells me that she has always been drinking a lot of water to keep herself \"hydrated\". Nephrology was consulted because she had hyponatremia with a sodium level of 121. Initial urine studies showed urinary sodium of less than 20, urine osmolality was only 93. Her serum sodium level this morning was up to 126, repeat level is up to 129. Prior to admission she did receive some IV fluids. She was correcting a little quickly so I believe over the last 12 hours she has received some hypotonic fluid as well. This morning patient has had small bowel follow-through. She is now complaining of a little diarrhea. Her serum creatinine is normal, has mild hypokalemia.   Currently has an NG tube in place      OBJECTIVE      CURRENT TEMPERATURE:  Temp: 98.1 °F (36.7 °C)  MAXIMUM TEMPERATURE OVER 24HRS:  Temp (24hrs), Av.2 °F (36.8 °C), Min:97.9 °F (36.6 °C), Max:98.4 °F (36.9 °C)    CURRENT RESPIRATORY RATE:  Resp: 19  CURRENT PULSE:  Pulse: 99  CURRENT BLOOD PRESSURE:  BP: 138/84  24HR BLOOD PRESSURE RANGE:  Systolic (73ZDF), XXM:441 , Min:82 , ZMH:357   ; Diastolic (83LPY), CEE:88, Min:53, Max:98    24HR INTAKE/OUTPUT:      Intake/Output Summary (Last 24 hours) at 2019 1016  Last data filed at 2019 0600  Gross per 24 hour   Intake 152 ml   Output 3220 ml   Net -3068 ml     WEIGHT :  Patient Vitals for the past 96 hrs (Last 3 readings):   Weight   19 0600 164 lb 10.9 oz (74.7 kg)   19 0000 179 lb 7.3 oz (81.4 kg)     PHYSICAL EXAM      GENERAL APPEARANCE: Awake, alert,  mild abdominal pain  SKIN: warm and dry, no rash or erythema  EYES: conjunctivae normal and sclera anicteric  ENT: no thrush no pharyngeal congestion   NECK:   No JVD. No carotid bruits and no carotid lymphadenopathy . PULMONARY: lungs are clear to auscultation. No Wheezing, no ronchi . CADRDIOVASCULAR: S1 and S2 normal NO S3 and NO S4 . No rubs , no murmur. ABDOMEN: Somewhat tender in the umbilical area, no organomegaly, no ascites.        EXTREMITIES: no cyanosis, clubbing or edema     CURRENT MEDICATIONS        potassium bicarb-citric acid (EFFER-K) effervescent tablet 40 mEq Once   ALPRAZolam (XANAX) tablet 0.5 mg BID PRN   haloperidol lactate (HALDOL) injection 2 mg Once   famotidine (PEPCID) injection 20 mg Daily   sodium chloride flush 0.9 % injection 10 mL 2 times per day   sodium chloride flush 0.9 % injection 10 mL PRN   acetaminophen (TYLENOL) tablet 650 mg Q4H PRN   morphine (PF) injection 2 mg Q2H PRN   albuterol sulfate  (90 Base) MCG/ACT inhaler 2 puff Q6H PRN   aspirin EC tablet 81 mg Daily   diltiazem (CARDIZEM CD) extended release capsule 360 mg Daily   losartan (COZAAR) tablet 50 mg Daily   traZODone (DESYREL) tablet 50 mg Nightly   sodium chloride flush 0.9 % injection 10 mL 2 times per day   sodium chloride flush 0.9 % injection 10 mL PRN   magnesium hydroxide (MILK OF MAGNESIA) 400 MG/5ML suspension 30 mL Daily PRN   ondansetron (ZOFRAN) injection 4 mg Q6H PRN   enoxaparin (LOVENOX) injection 40 mg Daily   0.9 % sodium chloride infusion Continuous   potassium chloride 10 mEq/100 mL IVPB (Peripheral Line) PRN   magnesium sulfate 1 g in dextrose 5% 100 mL IVPB PRN         LABS      CBC: Recent Labs     06/22/19  2020 06/23/19  0454 06/24/19  0509   WBC 8.5 5.4 5.5   RBC 4.32 4.27 4.22   HGB 12.7 12.3 12.3   HCT 36.8 37.2 38.2   MCV 85.2 87.1 90.5   MCH 29.4 28.8 29.1   MCHC 34.5 33.1 32.2   RDW 13.2 13.4 13.7    358 375   MPV 9.1 9.1 9.5      BMP: Recent Labs     06/22/19  2020  06/23/19  0454 06/23/19  1203 06/23/19  1836 06/23/19  2230 06/24/19  0509 06/24/19  0757   *   < > 126* 126* 120* 125* 126* 129*   K 2.6*  --  2.5* 2.8* 3.3*  --  3.1*  --    CL 80*  --  84*  --   --   --  86*  --    CO2 27  --  31  --   --   --  27  --    BUN 7*  --  8  --   --   --  8  --    CREATININE 0.71  --  0.83  --   --   --  0.71  --    GLUCOSE 110*  --  112*  --   --   --  108*  --    CALCIUM 9.9  --  9.6  --   --   --  8.9  --     < > = values in this interval not displayed. PHOSPHORUS:    Recent Labs     06/22/19 2020 06/22/19  2300   PHOS 4.0 3.9     MAGNESIUM:   Recent Labs     06/22/19  2300 06/23/19  0454 06/24/19  0509   MG 1.8 2.3 2.1     ALBUMIN:   Recent Labs     06/22/19 2020 06/23/19  0454   LABALBU 4.2 4.0     I    SPEP: Lab Results   Component Value Date    PROT 6.9 06/23/2019   URINE SODIUM:    Lab Results   Component Value Date    FARRAH <20 06/23/2019      URINE CREATININE:  Lab Results   Component Value Date    LABCREA 33.7 06/23/2019     Urine osmolarity: 93    URINALYSIS:  U/A: Lab Results   Component Value Date    NITRU NEGATIVE 06/23/2019    COLORU YELLOW 06/23/2019    PHUR 7.0 06/23/2019    WBCUA None 06/23/2019    RBCUA 0 TO 2 06/23/2019    MUCUS NOT REPORTED 06/23/2019    TRICHOMONAS NOT REPORTED 06/23/2019    YEAST NOT REPORTED 06/23/2019    BACTERIA NOT REPORTED 06/23/2019    SPECGRAV 1.004 06/23/2019    LEUKOCYTESUR NEGATIVE 06/23/2019    UROBILINOGEN Normal 06/23/2019    BILIRUBINUR NEGATIVE 06/23/2019    GLUCOSEU NEGATIVE 06/23/2019    KETUA NEGATIVE 06/23/2019    AMORPHOUS NOT REPORTED 06/23/2019         ASSESSMENT      1. Hyponatremia: Admission urine studies indicate very dilute urine in face of patient having history of significant free water intake. We are not dealing with an SIADH picture. 2. HTN: Stable blood pressures  3. History of inflammatory bowel disease, has had 2 previous bowel surgeries. 4.  Currently patient is n.p.o., has developed some diarrhea and still has some nausea  5.   Mild hypokalemia likely secondary to creased intake and mild secondary hyperaldosteronism    PLAN      1. While patient is n.p.o., will start patient on normal saline with 30 M EQ's of potassium chloride at 60 mL an hour   2. She might need additional potassium and magnesium supplements as needed  3. Follow up labs ordered. 4. Following along       Please do not hesitate to call with questions.     Electronically signed by Hernan Manrique MD on 6/24/2019 at 10:16 AM

## 2019-06-24 NOTE — PROGRESS NOTES
Encounters:   06/24/19 164 lb 10.9 oz (74.7 kg)     Body mass index is 30.12 kg/m². PHYSICAL EXAM:  GEN:  awake, alert, cooperative, no apparent distress, and appears stated age  EYES:  pupils equal, round, and reactive to light, vision intact  HEENT:  Normocephalic, without obvious abnormality, atramatic, external ears without lesions, oral pharynx with moist mucus membranes, tonsils without erythema or exudates, gums normal and good dentition.   LUNGS:  no increased work of breathing, good air exchange and clear to auscultation  CV:    regular rate and rhythm  ABDOMEN:   soft and very minimal lower mid quadrant tenderness, mild distension, no rebound or guarding   MSK:    there is no redness, warmth, or swelling of the joints  NEURO[de-identified]   Awake, alert, oriented to name, place and time  SKIN:   No bruising or bleeding, Normal skin color, texture, turgor  EXTREMITIES:  No pedal or leg edema, no calf tenderness/swelling, no erythema, distal pulses intact     MEDICATIONS:  Scheduled Meds:   potassium bicarb-citric acid  40 mEq Oral Once    haloperidol lactate  2 mg Intravenous Once    famotidine (PEPCID) injection  20 mg Intravenous Daily    sodium chloride flush  10 mL Intravenous 2 times per day    aspirin  81 mg Oral Daily    diltiazem  360 mg Oral Daily    losartan  50 mg Oral Daily    traZODone  50 mg Oral Nightly    sodium chloride flush  10 mL Intravenous 2 times per day    enoxaparin  40 mg Subcutaneous Daily     Continuous Infusions:   sodium chloride 10 mL/hr at 06/22/19 2345     PRN Meds:     sodium chloride flush 10 mL PRN   acetaminophen 650 mg Q4H PRN   morphine 2 mg Q2H PRN   albuterol sulfate HFA 2 puff Q6H PRN   sodium chloride flush 10 mL PRN   magnesium hydroxide 30 mL Daily PRN   ondansetron 4 mg Q6H PRN   potassium chloride 10 mEq PRN   magnesium sulfate 1 g PRN       SUPPORT DEVICES: [] Ventilator [] BIPAP  [] Nasal Cannula [x] Room Air    DATA:  Complete Blood Count: Recent Labs abdomen and bowel sounds are positive, she still have an NG tube in place she was seen by surgery and she had a small bowel follow-through done today and contrast is passing all the way to the rectum and they recommended to discontinue NG tube and to start clear liquid and see if she can tolerate, according to patient she had these episodes every few weeks, her hyponatremia has been stable and gradually proved and her sodium is 1 26-1 29 today, she is also getting potassium replacement for hypokalemia. 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 She also history of alpha-1 antitrypsin deficiency according to patient and she is on Anoro  She was having sodium check every 4 hours since her sodium is improved we will decrease the frequency of sodium checked. We will get GI evaluation. Discontinue NG tube. Start clear liquid. Continue with IV fluid with saline and KCL. Follow-up sodium and potassium.     OK to transfer to medicine floor with medicine team once on the floor critical care team will sign off      Sagrario Cueva MD  6/24/2019 11:30 AM

## 2019-06-24 NOTE — PLAN OF CARE
Problem: Falls - Risk of:  Goal: Will remain free from falls  Description  Will remain free from falls  6/23/2019 2122 by Parker Kennedy RN  Outcome: Ongoing  6/23/2019 0820 by Ye Vidal RN  Outcome: Ongoing     Problem:  Activity:  Goal: Ability to tolerate increased activity will improve  Description  Ability to tolerate increased activity will improve  6/23/2019 2122 by Parker Kennedy RN  Outcome: Ongoing  6/23/2019 0820 by Ye Vidal RN  Outcome: Ongoing

## 2019-06-25 ENCOUNTER — APPOINTMENT (OUTPATIENT)
Dept: GENERAL RADIOLOGY | Age: 69
DRG: 389 | End: 2019-06-25
Payer: MEDICARE

## 2019-06-25 PROBLEM — K52.839 MICROSCOPIC COLITIS: Status: ACTIVE | Noted: 2019-06-25

## 2019-06-25 LAB
ABSOLUTE EOS #: <0.03 K/UL (ref 0–0.44)
ABSOLUTE IMMATURE GRANULOCYTE: <0.03 K/UL (ref 0–0.3)
ABSOLUTE LYMPH #: 1.07 K/UL (ref 1.1–3.7)
ABSOLUTE MONO #: 0.7 K/UL (ref 0.1–1.2)
ANION GAP SERPL CALCULATED.3IONS-SCNC: 11 MMOL/L (ref 9–17)
BASOPHILS # BLD: 0 % (ref 0–2)
BASOPHILS ABSOLUTE: <0.03 K/UL (ref 0–0.2)
BUN BLDV-MCNC: 8 MG/DL (ref 8–23)
BUN/CREAT BLD: ABNORMAL (ref 9–20)
CALCIUM SERPL-MCNC: 8.9 MG/DL (ref 8.6–10.4)
CHLORIDE BLD-SCNC: 94 MMOL/L (ref 98–107)
CO2: 26 MMOL/L (ref 20–31)
CREAT SERPL-MCNC: 0.67 MG/DL (ref 0.5–0.9)
DIFFERENTIAL TYPE: ABNORMAL
EOSINOPHILS RELATIVE PERCENT: 0 % (ref 1–4)
GFR AFRICAN AMERICAN: >60 ML/MIN
GFR NON-AFRICAN AMERICAN: >60 ML/MIN
GFR SERPL CREATININE-BSD FRML MDRD: ABNORMAL ML/MIN/{1.73_M2}
GFR SERPL CREATININE-BSD FRML MDRD: ABNORMAL ML/MIN/{1.73_M2}
GLUCOSE BLD-MCNC: 103 MG/DL (ref 70–99)
HCT VFR BLD CALC: 35.6 % (ref 36.3–47.1)
HEMOGLOBIN: 11.3 G/DL (ref 11.9–15.1)
IMMATURE GRANULOCYTES: 0 %
LYMPHOCYTES # BLD: 20 % (ref 24–43)
MCH RBC QN AUTO: 29 PG (ref 25.2–33.5)
MCHC RBC AUTO-ENTMCNC: 31.7 G/DL (ref 28.4–34.8)
MCV RBC AUTO: 91.3 FL (ref 82.6–102.9)
MONOCYTES # BLD: 13 % (ref 3–12)
NRBC AUTOMATED: 0 PER 100 WBC
PDW BLD-RTO: 13.9 % (ref 11.8–14.4)
PLATELET # BLD: 350 K/UL (ref 138–453)
PLATELET ESTIMATE: ABNORMAL
PMV BLD AUTO: 9.4 FL (ref 8.1–13.5)
POTASSIUM SERPL-SCNC: 3.7 MMOL/L (ref 3.7–5.3)
RBC # BLD: 3.9 M/UL (ref 3.95–5.11)
RBC # BLD: ABNORMAL 10*6/UL
SEG NEUTROPHILS: 67 % (ref 36–65)
SEGMENTED NEUTROPHILS ABSOLUTE COUNT: 3.54 K/UL (ref 1.5–8.1)
SODIUM BLD-SCNC: 128 MMOL/L (ref 135–144)
SODIUM BLD-SCNC: 130 MMOL/L (ref 135–144)
SODIUM BLD-SCNC: 131 MMOL/L (ref 135–144)
WBC # BLD: 5.4 K/UL (ref 3.5–11.3)
WBC # BLD: ABNORMAL 10*3/UL

## 2019-06-25 PROCEDURE — 2060000000 HC ICU INTERMEDIATE R&B

## 2019-06-25 PROCEDURE — 6370000000 HC RX 637 (ALT 250 FOR IP): Performed by: INTERNAL MEDICINE

## 2019-06-25 PROCEDURE — APPNB45 APP NON BILLABLE 31-45 MINUTES: Performed by: INTERNAL MEDICINE

## 2019-06-25 PROCEDURE — 85025 COMPLETE CBC W/AUTO DIFF WBC: CPT

## 2019-06-25 PROCEDURE — 94760 N-INVAS EAR/PLS OXIMETRY 1: CPT

## 2019-06-25 PROCEDURE — 6360000002 HC RX W HCPCS: Performed by: INTERNAL MEDICINE

## 2019-06-25 PROCEDURE — 2500000003 HC RX 250 WO HCPCS: Performed by: STUDENT IN AN ORGANIZED HEALTH CARE EDUCATION/TRAINING PROGRAM

## 2019-06-25 PROCEDURE — 6370000000 HC RX 637 (ALT 250 FOR IP): Performed by: STUDENT IN AN ORGANIZED HEALTH CARE EDUCATION/TRAINING PROGRAM

## 2019-06-25 PROCEDURE — 6360000002 HC RX W HCPCS: Performed by: STUDENT IN AN ORGANIZED HEALTH CARE EDUCATION/TRAINING PROGRAM

## 2019-06-25 PROCEDURE — 6360000002 HC RX W HCPCS: Performed by: EMERGENCY MEDICINE

## 2019-06-25 PROCEDURE — 6370000000 HC RX 637 (ALT 250 FOR IP): Performed by: EMERGENCY MEDICINE

## 2019-06-25 PROCEDURE — 36415 COLL VENOUS BLD VENIPUNCTURE: CPT

## 2019-06-25 PROCEDURE — 2580000003 HC RX 258: Performed by: INTERNAL MEDICINE

## 2019-06-25 PROCEDURE — 94640 AIRWAY INHALATION TREATMENT: CPT

## 2019-06-25 PROCEDURE — 99233 SBSQ HOSP IP/OBS HIGH 50: CPT | Performed by: INTERNAL MEDICINE

## 2019-06-25 PROCEDURE — 74018 RADEX ABDOMEN 1 VIEW: CPT

## 2019-06-25 PROCEDURE — 84295 ASSAY OF SERUM SODIUM: CPT

## 2019-06-25 PROCEDURE — 80048 BASIC METABOLIC PNL TOTAL CA: CPT

## 2019-06-25 RX ORDER — KETOROLAC TROMETHAMINE 30 MG/ML
30 INJECTION, SOLUTION INTRAMUSCULAR; INTRAVENOUS EVERY 6 HOURS PRN
Status: DISCONTINUED | OUTPATIENT
Start: 2019-06-25 | End: 2019-06-26 | Stop reason: HOSPADM

## 2019-06-25 RX ADMIN — ONDANSETRON 4 MG: 2 INJECTION INTRAMUSCULAR; INTRAVENOUS at 08:27

## 2019-06-25 RX ADMIN — ALPRAZOLAM 0.5 MG: 0.5 TABLET ORAL at 23:03

## 2019-06-25 RX ADMIN — TRAZODONE HYDROCHLORIDE 50 MG: 50 TABLET ORAL at 21:48

## 2019-06-25 RX ADMIN — POTASSIUM CHLORIDE: 149 INJECTION, SOLUTION, CONCENTRATE INTRAVENOUS at 22:57

## 2019-06-25 RX ADMIN — ENOXAPARIN SODIUM 40 MG: 40 INJECTION SUBCUTANEOUS at 09:49

## 2019-06-25 RX ADMIN — BUDESONIDE 9 MG: 3 CAPSULE, GELATIN COATED ORAL at 09:49

## 2019-06-25 RX ADMIN — MORPHINE SULFATE 2 MG: 2 INJECTION, SOLUTION INTRAMUSCULAR; INTRAVENOUS at 07:22

## 2019-06-25 RX ADMIN — KETOROLAC TROMETHAMINE 30 MG: 30 INJECTION, SOLUTION INTRAMUSCULAR at 19:25

## 2019-06-25 RX ADMIN — KETOROLAC TROMETHAMINE 30 MG: 30 INJECTION, SOLUTION INTRAMUSCULAR at 11:55

## 2019-06-25 RX ADMIN — GLYCOPYRROLATE AND FORMOTEROL FUMARATE 2 PUFF: 9; 4.8 AEROSOL, METERED RESPIRATORY (INHALATION) at 20:34

## 2019-06-25 RX ADMIN — ASPIRIN 81 MG: 81 TABLET ORAL at 09:49

## 2019-06-25 RX ADMIN — ALPRAZOLAM 0.5 MG: 0.5 TABLET ORAL at 11:49

## 2019-06-25 RX ADMIN — FAMOTIDINE 20 MG: 10 INJECTION, SOLUTION INTRAVENOUS at 09:49

## 2019-06-25 RX ADMIN — LOSARTAN POTASSIUM 50 MG: 50 TABLET, FILM COATED ORAL at 09:49

## 2019-06-25 RX ADMIN — DILTIAZEM HYDROCHLORIDE 360 MG: 180 CAPSULE, COATED, EXTENDED RELEASE ORAL at 09:49

## 2019-06-25 ASSESSMENT — PAIN SCALES - GENERAL
PAINLEVEL_OUTOF10: 6
PAINLEVEL_OUTOF10: 7
PAINLEVEL_OUTOF10: 7
PAINLEVEL_OUTOF10: 10

## 2019-06-25 ASSESSMENT — PAIN DESCRIPTION - LOCATION
LOCATION_2: ABDOMEN
LOCATION: BACK

## 2019-06-25 ASSESSMENT — PAIN DESCRIPTION - PAIN TYPE
TYPE: CHRONIC PAIN
TYPE_2: ACUTE PAIN

## 2019-06-25 ASSESSMENT — PAIN DESCRIPTION - PROGRESSION: CLINICAL_PROGRESSION: GRADUALLY IMPROVING

## 2019-06-25 ASSESSMENT — PAIN DESCRIPTION - INTENSITY: RATING_2: 0

## 2019-06-25 ASSESSMENT — PAIN DESCRIPTION - ORIENTATION: ORIENTATION_2: LOWER;RIGHT

## 2019-06-25 NOTE — PLAN OF CARE
Problem: Falls - Risk of:  Goal: Will remain free from falls  Description  Will remain free from falls  6/25/2019 1803 by Dennis Gutierrez RN  Outcome: Met This Shift  6/25/2019 0430 by Lamar Lemons RN  Outcome: Ongoing     Problem:  Activity:  Goal: Ability to tolerate increased activity will improve  Description  Ability to tolerate increased activity will improve  Outcome: Met This Shift

## 2019-06-25 NOTE — PROGRESS NOTES
131*   K 2.5*   < > 3.3*  --  3.1*  --   --   --  3.7   CL 84*  --   --   --  86*  --   --   --  94*   CO2 31  --   --   --  27  --   --   --  26   BUN 8  --   --   --  8  --   --   --  8   CREATININE 0.83  --   --   --  0.71  --   --   --  0.67   GLUCOSE 112*  --   --   --  108*  --   --   --  103*   CALCIUM 9.6  --   --   --  8.9  --   --   --  8.9    < > = values in this interval not displayed. LFTS  Recent Labs     06/22/19 2020 06/23/19  0454   ALKPHOS 73 71   ALT 15 14   AST 21 19   PROT 7.0 6.9   BILITOT 0.55 0.43   LABALBU 4.2 4.0       AMYLASE/LIPASE/AMMONIA  No results for input(s): AMYLASE, LIPASE, AMMONIA in the last 72 hours. PT/INR  No results for input(s): PROTIME, INR in the last 72 hours. ANEMIA STUDIES  No results for input(s): LABIRON, TIBC, FERRITIN, GHKNQCVW90, FOLATE, OCCULTBLD in the last 72 hours. IMAGING  Xr Abdomen (kub) (single Ap View)    Result Date: 6/23/2019  EXAMINATION: ONE SUPINE XRAY VIEW(S) OF THE ABDOMEN 6/23/2019 5:40 am COMPARISON: None. HISTORY: ORDERING SYSTEM PROVIDED HISTORY: SBO TECHNOLOGIST PROVIDED HISTORY: SBO Ordering Physician Provided Reason for Exam: portable supine, SBO Acuity: Unknown Type of Exam: Unknown FINDINGS: Cardiac monitoring leads overlie the chest.  Enteric tube projects in the upper abdomen possibly within the gastric body. Nonspecific bowel gas pattern. No dilated air-filled loops of bowel. Stool in the ascending colon. Patchy air throughout the transverse colon and descending colon. Pelvic phleboliths. Arthritic changes bilateral hips. SI joint degenerative changes. Nonspecific bowel gas pattern with presence of distal gas. And NG tube projects in the upper abdomen likely in the gastric body. Fl Small Bowel Follow Through Only    Result Date: 6/24/2019  EXAMINATION: SMALL BOWEL FOLLOW THROUGH SERIES 6/24/2019 TECHNIQUE: Small bowel follow through series was performed with overhead images and spot images.  FLUOROSCOPY DOSE AND TYPE OR TIME AND EXPOSURES: DAP 0Gycm2. No fluoroscopy was performed. COMPARISON: None HISTORY: ORDERING SYSTEM PROVIDED HISTORY: sbo TECHNOLOGIST PROVIDED HISTORY: sbo Ordering Physician Provided Reason for Exam: ? small bowel obst./300ml Omnipaque thru NG Acuity: Unknown Type of Exam: Unknown FINDINGS:  abdominal image demonstrates NG tube in place. Gas is seen in small bowel large bowel. Some borderline dilated right lower quadrant small bowel loops noted. After administration of contrast, serial imaging of the GI tract was performed with some images acquired in the department and others portable in the patient's room. Study shows multiple dilated small bowel loops in the proximal and mid abdomen with normal caliber loops in the left lower quadrant. Small bowel transit time is normal.  Findings consistent with low grade mid small bowel obstruction. Abnormally dilated proximal and mid small bowel loops with normal caliber distal small bowel loops noted along with normal small bowel transit time. Findings indicative of low grade partial small bowel obstruction. The findings were sent to the Radiology Results [address] at 12:23 pm on 6/24/2019to be communicated to a licensed caregiver. Assessment:     78 y/o female with hx of IBS, ? IBD, ? Crohn's disease (Listed on chart as UC) s/p exploratory lap, right hemicolectomy, ventral hernia repair with history of bowel obstruction. Patient presents as transfer from [facility].  She presented there with complaints of N/V, abdominal distention and abdominal pain. CT A/P showed moderate severity small bowel obstruction secondary to adhesions from mesh in ventral abdomen. Same pattern as obstruction seen on 06/25/2018. Labs showed severe hyponatremia.  GI consulted for management of Crohn's.       1. IBD- **Based on conversation with patient's established gastroenterologists Dr. Angélica Choudhary,-  patient DOES NOT HAVE endoscopic or pathological evidence of IBD. Patient has microscopic colitis. - Patient will need tx with steroids or Bismuth. Patient wishes to start treatment with locally selective steroids.   (above discussed at length with patient and daughter)      2. SBO secondary to adhesions related to mesh (per CT results)     3. Chronic Diarrhea - likely due to microscopic colitis    Plan:     1. Start Budesonide 9 mg daily x 8 weeks  2. SBO management and diet per general surgery  3. Patient will follow up with established gastroenterologist Dr. Jean-Paul Remy post discharge  4. Would try to avoid antidiarrheal medicine with history of SBO  5. GI will sign off. Please call GI with any questions, concerns or acute change in clinical status      This plan was formulated in collaboration with Dr. Shivam Alaniz    Electronically signed by Rosalino Oliva CNP on 6/25/2019 at 7:11 AM    Please note that this note was generated using a voice recognition dictation software. Although every effort was made to ensure the accuracy of this automated transcription, some errors in transcription may have occurred.

## 2019-06-25 NOTE — PLAN OF CARE
Problem: Falls - Risk of:  Goal: Will remain free from falls  Description  Will remain free from falls  6/25/2019 0430 by Neil Gusman RN  Outcome: Ongoing   No falls this shift. Precautions include nonskid footwear on, bed locked in lowest position, siderails up x2, table and call light within reach. Bed alarm on. Patient calls out appropriately for assistance. Hourly rounding to assess for needs. Up to restroom with one assist.    Problem: Pain:  Goal: Control of chronic pain  Description  Control of chronic pain  6/25/2019 0430 by Neil Gusman RN  Outcome: Ongoing   Patient educated on available pain control measures including non-pharmacologic and medications. Pain goal discussed. Whiteboard updated for available time of next administration PRN. Will continue ordered interventions & assess pain. Complains of chronic arthritis pain and acute abdominal pain unrelieved by current interventions, declined escalation. Problem: Bowel/Gastric:  Goal: Ability to achieve a regular elimination pattern will improve  Description  Ability to achieve a regular elimination pattern will improve  Outcome: Ongoing   Several episodes of loose stools this shift.

## 2019-06-25 NOTE — PROGRESS NOTES
[address] [place] [address]     Department of Internal Medicine - Staff Internal Medicine Service     DAILY PROGRESS NOTE       Patient:  Mehul Aguilar  YOB: 1950  MRN: [id number]     Acct: [de-identified]     Admit date: 6/22/2019  Admitting Diagnosis: <principal problem not specified>    Subjective:   Patient seen and examined at bedside  No acute events overnight  Patient complaining of nausea and pain in the rt side abdomen and back  She did not have any vomiting    Is on clear liquid diet  Passing gas but no bowel moment  No chest pain or sob    Review of Systems   Constitutional: Negative for activity change and appetite change. HENT: Negative. Eyes: Negative. Respiratory: Negative for cough, shortness of breath and stridor. Cardiovascular: Negative for chest pain and leg swelling. Gastrointestinal: positive for abdominal distention and abdominal pain. Endocrine: Negative. Genitourinary: Negative for difficulty urinating and dyspareunia. Musculoskeletal: Negative for arthralgias, back pain, gait problem and joint swelling. Neurological: Negative for dizziness. Psychiatric/Behavioral: Negative for agitation.         Objective:   BP (!) 140/77   Pulse 69   Temp 98.9 °F (37.2 °C) (Temporal)   Resp 14   Ht 5' 2\" (1.575 m)   Wt 178 lb 9.2 oz (81 kg)   SpO2 92%   BMI 32.66 kg/m²       General appearance - alert, well appearing, and in some distress  Mental status - alert, oriented to person, place, and time  Eyes - pupils equal and reactive, extraocular eye movements intact  Mouth - mucous membranes moist, pharynx normal without lesions  Chest - clear to auscultation, no wheezes, rales or rhonchi, symmetric air entry  Heart - normal rate, regular rhythm, normal S1, S2, no murmurs, rubs, clicks or gallops  Abdomen - soft, tender in rt side quadrant, nondistended, no masses or organomegaly  Neurological - alert, oriented, normal speech, no focal findings or movement input(s): APTT in the last 72 hours. CARDIAC ENZYMES: No results for input(s): CKMB, CKMBINDEX, TROPONINI in the last 72 hours. Invalid input(s): CKTOTAL;3  FASTING LIPID PANEL:No results found for: CHOL, HDL, TRIG  LIVER PROFILE:   Recent Labs     06/22/19  2020 06/23/19  0454   AST 21 19   ALT 15 14   BILITOT 0.55 0.43   ALKPHOS 73 71        Xr Abdomen (kub) (single Ap View)    Result Date: 6/23/2019  EXAMINATION: ONE SUPINE XRAY VIEW(S) OF THE ABDOMEN 6/23/2019 5:40 am COMPARISON: None. HISTORY: ORDERING SYSTEM PROVIDED HISTORY: SBO TECHNOLOGIST PROVIDED HISTORY: SBO Ordering Physician Provided Reason for Exam: portable supine, SBO Acuity: Unknown Type of Exam: Unknown FINDINGS: Cardiac monitoring leads overlie the chest.  Enteric tube projects in the upper abdomen possibly within the gastric body. Nonspecific bowel gas pattern. No dilated air-filled loops of bowel. Stool in the ascending colon. Patchy air throughout the transverse colon and descending colon. Pelvic phleboliths. Arthritic changes bilateral hips. SI joint degenerative changes. Nonspecific bowel gas pattern with presence of distal gas. And NG tube projects in the upper abdomen likely in the gastric body. Fl Small Bowel Follow Through Only    Result Date: 6/24/2019  EXAMINATION: SMALL BOWEL FOLLOW THROUGH SERIES 6/24/2019 TECHNIQUE: Small bowel follow through series was performed with overhead images and spot images. FLUOROSCOPY DOSE AND TYPE OR TIME AND EXPOSURES: DAP 0Gycm2. No fluoroscopy was performed. COMPARISON: None HISTORY: ORDERING SYSTEM PROVIDED HISTORY: sbo TECHNOLOGIST PROVIDED HISTORY: sbo Ordering Physician Provided Reason for Exam: ? small bowel obst./300ml Omnipaque thru NG Acuity: Unknown Type of Exam: Unknown FINDINGS:  abdominal image demonstrates NG tube in place. Gas is seen in small bowel large bowel. Some borderline dilated right lower quadrant small bowel loops noted.  After administration of contrast, serial imaging of the GI tract was performed with some images acquired in the department and others portable in the patient's room. Study shows multiple dilated small bowel loops in the proximal and mid abdomen with normal caliber loops in the left lower quadrant. Small bowel transit time is normal.  Findings consistent with low grade mid small bowel obstruction. Abnormally dilated proximal and mid small bowel loops with normal caliber distal small bowel loops noted along with normal small bowel transit time. Findings indicative of low grade partial small bowel obstruction. The findings were sent to the Radiology Results [address] at 12:23 pm on 6/24/2019to be communicated to a licensed caregiver. Assessment and Plan: Active Problems:    Small bowel obstruction (HCC)    Hyponatremia    Hypokalemia    COPD (chronic obstructive pulmonary disease) (HCC)    Microscopic colitis  Resolved Problems:    * No resolved hospital problems.  *      Partial Small bowel obstruction  Ng tube removed  Diet clear liquid  No acute intervention as per gen surg  Small bowel follow through show low grade partial small bowel obstruction  Iv pepcid  Recall gen surg as patient reporting nausea     Hyponatremia  Sodium checks q6  Started on Normal saline @ 60ml/hr     Hypokalemia  Potassium in iv fluid  Supplement additional as per the labs     Microscopic colitis  Continue budenoside 9 mg daily for 8 weeks  Follow GI recommendations     Hypertension  Continue cardizem 360mg, losartan 50mg     COPD  Continue bevespi     PT/OT  DVT prophylaxis- lovenox  Diet- clear liquid, progress as tolerated   for discharge plan      [address], PGY-1  [address] [place], [place]  6/25/2019,9:06 AM

## 2019-06-25 NOTE — PROGRESS NOTES
NEPHROLOGY PROGRESS NOTE      SUBJECTIVE     On IV fluids containing potassium. Able to take oral liquids now. Sodium improved to 131. Complains of nausea and pain in the right side of the abdomen and back. No acute episodes of vomiting. Passing gas but no bowel movements. OBJECTIVE     Vitals:    06/25/19 0407 06/25/19 0445 06/25/19 0700 06/25/19 0832   BP: (!) 98/55  (!) 140/77    Pulse: 67  69    Resp: 15  11 14   Temp: 97.7 °F (36.5 °C)  98.9 °F (37.2 °C)    TempSrc: Temporal  Temporal    SpO2: 100%  95% 92%   Weight:  178 lb 9.2 oz (81 kg)     Height:         24HR INTAKE/OUTPUT:      Intake/Output Summary (Last 24 hours) at 6/25/2019 1137  Last data filed at 6/25/2019 0454  Gross per 24 hour   Intake 1301 ml   Output --   Net 1301 ml       General appearance:Awake, alert, in no acute distress  HEENT: PERRLA  Respiratory::vesicular breath sounds,no wheeze/crackles  Cardiovascular:S1 S2 normal,no gallop or organic murmur. Abdomen:Non tender/non distended. Bowel sounds present  Extremities: No Cyanosis or Clubbing,Lower extremity edema  Neurological:Alert and oriented. No abnormalities of mood, affect, memory, mentation, or behavior are noted      MEDICATIONS     Scheduled Meds:    glycopyrrolate-formoterol  2 puff Inhalation BID    budesonide  9 mg Oral Daily    haloperidol lactate  2 mg Intravenous Once    famotidine (PEPCID) injection  20 mg Intravenous Daily    sodium chloride flush  10 mL Intravenous 2 times per day    aspirin  81 mg Oral Daily    diltiazem  360 mg Oral Daily    losartan  50 mg Oral Daily    traZODone  50 mg Oral Nightly    sodium chloride flush  10 mL Intravenous 2 times per day    enoxaparin  40 mg Subcutaneous Daily     Continuous Infusions:    IV infusion builder 60 mL/hr at 06/24/19 1212     PRN Meds:  ketorolac, ALPRAZolam, sodium chloride flush, acetaminophen, morphine, albuterol sulfate HFA, sodium chloride flush, magnesium hydroxide, ondansetron, potassium chloride, magnesium sulfate  Home Meds:                Medications Prior to Admission: UMECLIDINIUM-VILANTEROL IN, Inhale into the lungs  cyanocobalamin 1000 MCG tablet, Take 1,000 mcg by mouth daily  albuterol sulfate  (90 Base) MCG/ACT inhaler, Inhale 2 puffs into the lungs every 6 hours as needed for Wheezing  FLUTICASONE FUROATE IN, Inhale into the lungs  losartan (COZAAR) 50 MG tablet, Take 50 mg by mouth daily  Esomeprazole Magnesium 20 MG TBEC, Take by mouth  ALPRAZolam (XANAX) 0.5 MG tablet, Take 0.5 mg by mouth nightly as needed for Sleep.  traZODone (DESYREL) 50 MG tablet, Take 50 mg by mouth nightly  vitamin D (CHOLECALCIFEROL) 5000 units CAPS capsule, Take 5,000 Units by mouth daily  diltiazem (CARDIZEM CD) 300 MG extended release capsule, Take 360 mg by mouth daily  aspirin 81 MG tablet, Take 81 mg by mouth daily    INVESTIGATIONS     Last 3 CMP:    Recent Labs     06/22/19 2020 06/23/19  0454  06/23/19  1836  06/24/19  0509  06/24/19  1834 06/24/19  2345 06/25/19  0549   *   < > 126*   < > 120*   < > 126*   < > 128* 128* 131*   K 2.6*  --  2.5*   < > 3.3*  --  3.1*  --   --   --  3.7   CL 80*  --  84*  --   --   --  86*  --   --   --  94*   CO2 27  --  31  --   --   --  27  --   --   --  26   BUN 7*  --  8  --   --   --  8  --   --   --  8   CREATININE 0.71  --  0.83  --   --   --  0.71  --   --   --  0.67   CALCIUM 9.9  --  9.6  --   --   --  8.9  --   --   --  8.9   PROT 7.0  --  6.9  --   --   --   --   --   --   --   --    LABALBU 4.2  --  4.0  --   --   --   --   --   --   --   --    BILITOT 0.55  --  0.43  --   --   --   --   --   --   --   --    ALKPHOS 73  --  71  --   --   --   --   --   --   --   --    AST 21  --  19  --   --   --   --   --   --   --   --    ALT 15  --  14  --   --   --   --   --   --   --   --     < > = values in this interval not displayed.        Last 3 CBC:  Recent Labs     06/23/19  0454 06/24/19  0509 06/25/19  0549   WBC 5.4 5.5 5.4   RBC 4.27 4.22 3.90* HGB 12.3 12.3 11.3*   HCT 37.2 38.2 35.6*   MCV 87.1 90.5 91.3   MCH 28.8 29.1 29.0   MCHC 33.1 32.2 31.7   RDW 13.4 13.7 13.9    375 350   MPV 9.1 9.5 9.4       ASSESSMENT     1. Hyponatremia secondary to polydipsia -improving  2. Partial small bowel obstruction -conservative management  3. Lymphocytic colitis  4. Essential hypertension    PLAN     1. Continue fluids until oral intake optimized. Can be discontinued after that.   2.  We will sign off    Please do not hesitate to call with questions    This note is created with the assistance of a speech-recognition program. While intending to generate a document that actually reflects the content of the visit, no guarantees can be provided that every mistake has been identified and corrected by editing    Lonna Angelucci MD, [facility]), [address]   6/25/2019 11:37 AM  [facility]

## 2019-06-26 VITALS
TEMPERATURE: 98.1 F | HEART RATE: 96 BPM | OXYGEN SATURATION: 98 % | HEIGHT: 62 IN | DIASTOLIC BLOOD PRESSURE: 71 MMHG | RESPIRATION RATE: 16 BRPM | SYSTOLIC BLOOD PRESSURE: 142 MMHG | WEIGHT: 180.78 LBS | BODY MASS INDEX: 33.27 KG/M2

## 2019-06-26 LAB
ABSOLUTE EOS #: <0.03 K/UL (ref 0–0.44)
ABSOLUTE IMMATURE GRANULOCYTE: 0.03 K/UL (ref 0–0.3)
ABSOLUTE LYMPH #: 1.54 K/UL (ref 1.1–3.7)
ABSOLUTE MONO #: 0.59 K/UL (ref 0.1–1.2)
ANION GAP SERPL CALCULATED.3IONS-SCNC: 8 MMOL/L (ref 9–17)
BASOPHILS # BLD: 1 % (ref 0–2)
BASOPHILS ABSOLUTE: 0.03 K/UL (ref 0–0.2)
BUN BLDV-MCNC: 8 MG/DL (ref 8–23)
BUN/CREAT BLD: ABNORMAL (ref 9–20)
CALCIUM SERPL-MCNC: 9 MG/DL (ref 8.6–10.4)
CHLORIDE BLD-SCNC: 101 MMOL/L (ref 98–107)
CO2: 26 MMOL/L (ref 20–31)
CREAT SERPL-MCNC: 0.75 MG/DL (ref 0.5–0.9)
DIFFERENTIAL TYPE: ABNORMAL
EOSINOPHILS RELATIVE PERCENT: 0 % (ref 1–4)
GFR AFRICAN AMERICAN: >60 ML/MIN
GFR NON-AFRICAN AMERICAN: >60 ML/MIN
GFR SERPL CREATININE-BSD FRML MDRD: ABNORMAL ML/MIN/{1.73_M2}
GFR SERPL CREATININE-BSD FRML MDRD: ABNORMAL ML/MIN/{1.73_M2}
GLUCOSE BLD-MCNC: 91 MG/DL (ref 70–99)
HCT VFR BLD CALC: 31.9 % (ref 36.3–47.1)
HEMOGLOBIN: 10 G/DL (ref 11.9–15.1)
IMMATURE GRANULOCYTES: 1 %
LYMPHOCYTES # BLD: 29 % (ref 24–43)
MCH RBC QN AUTO: 29.1 PG (ref 25.2–33.5)
MCHC RBC AUTO-ENTMCNC: 31.3 G/DL (ref 28.4–34.8)
MCV RBC AUTO: 92.7 FL (ref 82.6–102.9)
MONOCYTES # BLD: 11 % (ref 3–12)
NRBC AUTOMATED: 0 PER 100 WBC
PDW BLD-RTO: 14.1 % (ref 11.8–14.4)
PLATELET # BLD: 343 K/UL (ref 138–453)
PLATELET ESTIMATE: ABNORMAL
PMV BLD AUTO: 9.6 FL (ref 8.1–13.5)
POTASSIUM SERPL-SCNC: 4.7 MMOL/L (ref 3.7–5.3)
RBC # BLD: 3.44 M/UL (ref 3.95–5.11)
RBC # BLD: ABNORMAL 10*6/UL
SEG NEUTROPHILS: 58 % (ref 36–65)
SEGMENTED NEUTROPHILS ABSOLUTE COUNT: 3.05 K/UL (ref 1.5–8.1)
SODIUM BLD-SCNC: 135 MMOL/L (ref 135–144)
WBC # BLD: 5.3 K/UL (ref 3.5–11.3)
WBC # BLD: ABNORMAL 10*3/UL

## 2019-06-26 PROCEDURE — 6370000000 HC RX 637 (ALT 250 FOR IP): Performed by: INTERNAL MEDICINE

## 2019-06-26 PROCEDURE — 85025 COMPLETE CBC W/AUTO DIFF WBC: CPT

## 2019-06-26 PROCEDURE — 2500000003 HC RX 250 WO HCPCS: Performed by: STUDENT IN AN ORGANIZED HEALTH CARE EDUCATION/TRAINING PROGRAM

## 2019-06-26 PROCEDURE — 36415 COLL VENOUS BLD VENIPUNCTURE: CPT

## 2019-06-26 PROCEDURE — 80048 BASIC METABOLIC PNL TOTAL CA: CPT

## 2019-06-26 PROCEDURE — 6360000002 HC RX W HCPCS: Performed by: EMERGENCY MEDICINE

## 2019-06-26 PROCEDURE — 6370000000 HC RX 637 (ALT 250 FOR IP): Performed by: EMERGENCY MEDICINE

## 2019-06-26 PROCEDURE — 99232 SBSQ HOSP IP/OBS MODERATE 35: CPT | Performed by: INTERNAL MEDICINE

## 2019-06-26 PROCEDURE — 6360000002 HC RX W HCPCS: Performed by: STUDENT IN AN ORGANIZED HEALTH CARE EDUCATION/TRAINING PROGRAM

## 2019-06-26 RX ORDER — BUDESONIDE 3 MG/1
9 CAPSULE, COATED PELLETS ORAL DAILY
Qty: 168 CAPSULE | Refills: 0 | Status: SHIPPED | OUTPATIENT
Start: 2019-06-27 | End: 2019-08-22

## 2019-06-26 RX ORDER — ASPIRIN 81 MG/1
81 TABLET ORAL DAILY
Qty: 30 TABLET | Refills: 3 | Status: SHIPPED | OUTPATIENT
Start: 2019-06-27

## 2019-06-26 RX ORDER — FAMOTIDINE 20 MG/1
20 TABLET, FILM COATED ORAL DAILY
Status: DISCONTINUED | OUTPATIENT
Start: 2019-06-27 | End: 2019-06-26 | Stop reason: HOSPADM

## 2019-06-26 RX ADMIN — ENOXAPARIN SODIUM 40 MG: 40 INJECTION SUBCUTANEOUS at 09:05

## 2019-06-26 RX ADMIN — DILTIAZEM HYDROCHLORIDE 360 MG: 180 CAPSULE, COATED, EXTENDED RELEASE ORAL at 09:05

## 2019-06-26 RX ADMIN — ASPIRIN 81 MG: 81 TABLET ORAL at 09:05

## 2019-06-26 RX ADMIN — BUDESONIDE 9 MG: 3 CAPSULE, GELATIN COATED ORAL at 09:05

## 2019-06-26 RX ADMIN — FAMOTIDINE 20 MG: 10 INJECTION, SOLUTION INTRAVENOUS at 09:05

## 2019-06-26 RX ADMIN — ALPRAZOLAM 0.5 MG: 0.5 TABLET ORAL at 09:04

## 2019-06-26 RX ADMIN — KETOROLAC TROMETHAMINE 30 MG: 30 INJECTION, SOLUTION INTRAMUSCULAR at 09:26

## 2019-06-26 RX ADMIN — LOSARTAN POTASSIUM 50 MG: 50 TABLET, FILM COATED ORAL at 09:05

## 2019-06-26 ASSESSMENT — PAIN SCALES - GENERAL: PAINLEVEL_OUTOF10: 8

## 2019-06-26 ASSESSMENT — PAIN DESCRIPTION - FREQUENCY: FREQUENCY: CONTINUOUS

## 2019-06-26 ASSESSMENT — PAIN DESCRIPTION - PROGRESSION: CLINICAL_PROGRESSION: GRADUALLY IMPROVING

## 2019-06-26 ASSESSMENT — PAIN DESCRIPTION - PAIN TYPE: TYPE: CHRONIC PAIN

## 2019-06-26 ASSESSMENT — PAIN DESCRIPTION - LOCATION: LOCATION: BACK

## 2019-06-26 ASSESSMENT — PAIN DESCRIPTION - DESCRIPTORS: DESCRIPTORS: ACHING

## 2019-06-26 ASSESSMENT — PAIN - FUNCTIONAL ASSESSMENT: PAIN_FUNCTIONAL_ASSESSMENT: ACTIVITIES ARE NOT PREVENTED

## 2019-06-26 NOTE — PLAN OF CARE
Problem: Falls - Risk of:  Goal: Will remain free from falls  Description  Will remain free from falls  6/26/2019 0310 by Carlota Sherman RN  Outcome: Ongoing   No falls this shift. Precautions include nonskid footwear on, bed locked in lowest position, siderails up x2, table and call light within reach. Bed alarm on. Patient calls out appropriately for assistance. Hourly rounding to assess for needs. Up with standby assist to bathroom and transferring from chair. Problem: Pain:  Goal: Pain level will decrease  Description  Pain level will decrease  Outcome: Ongoing   Patient educated on available pain control measures including non-pharmacologic and medications. Pain goal discussed. Whiteboard updated for available time of next administration PRN. Will continue ordered interventions & assess pain. States Toradol achieved significantly better pain control than morphine, also states relief from heating pad. Problem: Bowel/Gastric:  Goal: Ability to achieve a regular elimination pattern will improve  Description  Ability to achieve a regular elimination pattern will improve  Outcome: Ongoing   Continues to have loose stools with some mucus, patient states near baseline for her regular bowel pattern.

## 2019-06-26 NOTE — PROGRESS NOTES
[address] [place] [address]     Department of Internal Medicine - Staff Internal Medicine Service     DAILY PROGRESS NOTE       Patient:  Aura Huston  YOB: 1950  MRN: [id number]     Acct: [de-identified]     Admit date: 6/22/2019  Admitting Diagnosis: <principal problem not specified>    Subjective:   Patient seen and examined at bedside  No acute events overnight  Patient feels better and the pain is improved  Patient had a bowel movement    Feels hungry  Will start her diet    Urine output is good  Electrolytes are improved    Review of Systems   Constitutional: Negative for activity change and appetite change. HENT: Negative. Eyes: Negative. Respiratory: Negative for cough, shortness of breath and stridor. Cardiovascular: Negative for chest pain and leg swelling. Gastrointestinal: positive for abdominal distention and abdominal pain. Endocrine: Negative. Genitourinary: Negative for difficulty urinating and dyspareunia. Musculoskeletal: Negative for arthralgias, back pain, gait problem and joint swelling. Neurological: Negative for dizziness. Psychiatric/Behavioral: Negative for agitation.         Objective:   /68   Pulse 71   Temp 99.3 °F (37.4 °C) (Oral)   Resp 13   Ht 5' 2\" (1.575 m)   Wt 180 lb 12.4 oz (82 kg)   SpO2 95%   BMI 33.06 kg/m²       General appearance - alert, well appearing, and in some distress  Mental status - alert, oriented to person, place, and time  Eyes - pupils equal and reactive, extraocular eye movements intact  Mouth - mucous membranes moist, pharynx normal without lesions  Chest - clear to auscultation, no wheezes, rales or rhonchi, symmetric air entry  Heart - normal rate, regular rhythm, normal S1, S2, no murmurs, rubs, clicks or gallops  Abdomen - soft, tender in rt side quadrant, nondistended, no masses or organomegaly  Neurological - alert, oriented, normal speech, no focal findings or movement disorder noted  Musculoskeletal - no joint tenderness, deformity or swelling  Extremities - peripheral pulses normal, no pedal edema, no clubbing or cyanosis, bilateral deformity in fingers, toes  Skin - normal coloration and turgor, no rashes, no suspicious skin lesions noted      Intake/Output Summary (Last 24 hours) at 6/26/2019 0925  Last data filed at 6/26/2019 5479  Gross per 24 hour   Intake 1589 ml   Output 1 ml   Net 1588 ml         Medications:      glycopyrrolate-formoterol  2 puff Inhalation BID    budesonide  9 mg Oral Daily    haloperidol lactate  2 mg Intravenous Once    famotidine (PEPCID) injection  20 mg Intravenous Daily    sodium chloride flush  10 mL Intravenous 2 times per day    aspirin  81 mg Oral Daily    diltiazem  360 mg Oral Daily    losartan  50 mg Oral Daily    traZODone  50 mg Oral Nightly    sodium chloride flush  10 mL Intravenous 2 times per day    enoxaparin  40 mg Subcutaneous Daily       Diagnostic Labs and Imaging    CBC:   Recent Labs     06/24/19  0509 06/25/19  0549 06/26/19  0546   WBC 5.5 5.4 5.3   RBC 4.22 3.90* 3.44*   HGB 12.3 11.3* 10.0*   HCT 38.2 35.6* 31.9*   MCV 90.5 91.3 92.7   RDW 13.7 13.9 14.1    350 343     BMP:   Recent Labs     06/24/19  0509  06/25/19  0549 06/25/19  1151 06/26/19  0546   *   < > 131* 130* 135   K 3.1*  --  3.7  --  4.7   CL 86*  --  94*  --  101   CO2 27  --  26  --  26   BUN 8  --  8  --  8   CREATININE 0.71  --  0.67  --  0.75    < > = values in this interval not displayed. BNP: No results for input(s): BNP in the last 72 hours. PT/INR: No results for input(s): PROTIME, INR in the last 72 hours. APTT: No results for input(s): APTT in the last 72 hours. CARDIAC ENZYMES: No results for input(s): CKMB, CKMBINDEX, TROPONINI in the last 72 hours.     Invalid input(s): CKTOTAL;3  FASTING LIPID PANEL:No results found for: CHOL, HDL, TRIG  LIVER PROFILE:   No results for input(s): AST, ALT, ALB, BILIDIR, BILITOT, ALKPHOS in the last 72 hours. Xr Abdomen (kub) (single Ap View)    Result Date: 6/23/2019  EXAMINATION: ONE SUPINE XRAY VIEW(S) OF THE ABDOMEN 6/23/2019 5:40 am COMPARISON: None. HISTORY: ORDERING SYSTEM PROVIDED HISTORY: SBO TECHNOLOGIST PROVIDED HISTORY: SBO Ordering Physician Provided Reason for Exam: portable supine, SBO Acuity: Unknown Type of Exam: Unknown FINDINGS: Cardiac monitoring leads overlie the chest.  Enteric tube projects in the upper abdomen possibly within the gastric body. Nonspecific bowel gas pattern. No dilated air-filled loops of bowel. Stool in the ascending colon. Patchy air throughout the transverse colon and descending colon. Pelvic phleboliths. Arthritic changes bilateral hips. SI joint degenerative changes. Nonspecific bowel gas pattern with presence of distal gas. And NG tube projects in the upper abdomen likely in the gastric body. Fl Small Bowel Follow Through Only    Result Date: 6/24/2019  EXAMINATION: SMALL BOWEL FOLLOW THROUGH SERIES 6/24/2019 TECHNIQUE: Small bowel follow through series was performed with overhead images and spot images. FLUOROSCOPY DOSE AND TYPE OR TIME AND EXPOSURES: DAP 0Gycm2. No fluoroscopy was performed. COMPARISON: None HISTORY: ORDERING SYSTEM PROVIDED HISTORY: sbo TECHNOLOGIST PROVIDED HISTORY: sbo Ordering Physician Provided Reason for Exam: ? small bowel obst./300ml Omnipaque thru NG Acuity: Unknown Type of Exam: Unknown FINDINGS:  abdominal image demonstrates NG tube in place. Gas is seen in small bowel large bowel. Some borderline dilated right lower quadrant small bowel loops noted. After administration of contrast, serial imaging of the GI tract was performed with some images acquired in the department and others portable in the patient's room. Study shows multiple dilated small bowel loops in the proximal and mid abdomen with normal caliber loops in the left lower quadrant.   Small bowel transit time is normal.  Findings consistent

## 2019-06-26 NOTE — PROGRESS NOTES
CLINICAL PHARMACY NOTE: MEDS TO [address] Select Patient?: No  Total # of Prescriptions Filled: 1   The following medications were delivered to the patient:  · BUDESONIDE 3  Total # of Interventions Completed: 0  Time Spent (min): 0    Additional Documentation:

## 2019-06-26 NOTE — PROGRESS NOTES
Discharge education given at bedside. All questions answered. Pt discharged with all patient belongings. LDAs removed.

## 2019-06-26 NOTE — CARE COORDINATION
Discharge [address] [facility] Case Management Department  Written by: Sonny Urbano RN    Patient Name: Kye Jin  Attending Provider: Marcos Simmons MD  Admit Date: 2019  7:40 PM  MRN: [id number]  Account: [de-identified]                     : 1950  Discharge Date:  19       Disposition: home with [facility]  AVS with completed BRENT and home care order faxed to [facility].      Sonny Urbano RN

## 2019-06-26 NOTE — PLAN OF CARE
Problem: Falls - Risk of:  Goal: Will remain free from falls  Description  Will remain free from falls  6/26/2019 1515 by Georges Burkett RN  Outcome: Met This Shift    Problem: Activity:  Goal: Ability to tolerate increased activity will improve  Description  Ability to tolerate increased activity will improve  Outcome: Ongoing     Problem: Pain:  Goal: Pain level will decrease  Description  Pain level will decrease  6/26/2019 1515 by Georges Burkett RN  Outcome: Ongoing    Problem:  Bowel/Gastric:  Goal: Ability to achieve a regular elimination pattern will improve  Description  Ability to achieve a regular elimination pattern will improve  6/26/2019 1515 by Georges Burkett RN  Outcome: Ongoing

## 2019-06-30 NOTE — DISCHARGE SUMMARY
Gradually the diet was progressed and patient tolerated it well. On the last nominal x-ray patient did not have any obstruction. The hyponatremia, initially the patient was treated by D5 because of overcorrection. Urine sodium was less than 20, urine osmolarity was only 93. Per nephrology patient was slightly hyponatremic due to excess water intake. Was n.p.o., so patient was started on normal saline at 60 mL/h with potassium. Fluids were slowly weaned off once patient's appetite improved. Patient also had hypokalemia, replacement was provided. GI recommended starting budesonide 9 mg daily for 8 weeks. Patient had improvement in her symptoms. Patient's nausea and vomiting resolved, bowel movements started. Consults:   nephrology and gen surgery, GI    Procedures:  NONE    Any Hospital Acquired Infections: NONE    PATIENT'S DISCHARGE CONDITION:  STABLE    PATIENT/FAMILY INSTRUCTIONS:   Discharge Medication List as of 6/26/2019  6:08 PM      START taking these medications    Details   aspirin 81 MG EC tablet Take 1 tablet by mouth daily, Disp-30 tablet, R-3Normal      budesonide (ENTOCORT EC) 3 MG extended release capsule Take 3 capsules by mouth daily, Disp-168 capsule, R-0Normal         CONTINUE these medications which have NOT CHANGED    Details   UMECLIDINIUM-VILANTEROL IN Inhale into the lungsHistorical Med      cyanocobalamin 1000 MCG tablet Take 1,000 mcg by mouth dailyHistorical Med      albuterol sulfate  (90 Base) MCG/ACT inhaler Inhale 2 puffs into the lungs every 6 hours as needed for WheezingHistorical Med      FLUTICASONE FUROATE IN Inhale into the lungsHistorical Med      losartan (COZAAR) 50 MG tablet Take 50 mg by mouth dailyHistorical Med      Esomeprazole Magnesium 20 MG TBEC Take by mouthHistorical Med      ALPRAZolam (XANAX) 0.5 MG tablet Take 0.5 mg by mouth nightly as needed for Sleep. Historical Med      traZODone (DESYREL) 50 MG tablet Take 50 mg by mouth

## 2023-09-19 ENCOUNTER — APPOINTMENT (OUTPATIENT)
Dept: PRIMARY CARE | Facility: CLINIC | Age: 73
End: 2023-09-19
Payer: MEDICARE

## 2023-11-02 DIAGNOSIS — I10 PRIMARY HYPERTENSION: Primary | ICD-10-CM

## 2023-11-02 DIAGNOSIS — F41.9 ANXIETY: ICD-10-CM

## 2023-11-02 RX ORDER — CIPROFLOXACIN 500 MG/1
500 TABLET ORAL EVERY 12 HOURS
COMMUNITY
Start: 2023-01-16 | End: 2023-01-21

## 2023-11-02 RX ORDER — ONDANSETRON 4 MG/1
4 TABLET, FILM COATED ORAL EVERY 8 HOURS
COMMUNITY
Start: 2023-08-18

## 2023-11-02 RX ORDER — DICLOFENAC SODIUM 75 MG/1
75 TABLET, DELAYED RELEASE ORAL 2 TIMES DAILY
COMMUNITY
Start: 2022-12-14

## 2023-11-02 RX ORDER — FAMOTIDINE 20 MG/1
TABLET, FILM COATED ORAL
COMMUNITY
Start: 2023-10-17

## 2023-11-02 RX ORDER — MIRTAZAPINE 15 MG/1
TABLET, FILM COATED ORAL
COMMUNITY
Start: 2023-06-29

## 2023-11-02 RX ORDER — SUCRALFATE 1 G/1
1 TABLET ORAL 3 TIMES DAILY
COMMUNITY
Start: 2023-09-03

## 2023-11-02 RX ORDER — VORTIOXETINE 20 MG/1
TABLET, FILM COATED ORAL
COMMUNITY
Start: 2023-08-29 | End: 2023-11-02 | Stop reason: SDUPTHER

## 2023-11-02 RX ORDER — VORTIOXETINE 20 MG/1
20 TABLET, FILM COATED ORAL DAILY
Qty: 30 TABLET | Refills: 0 | Status: SHIPPED | OUTPATIENT
Start: 2023-11-02

## 2023-11-02 RX ORDER — LOSARTAN POTASSIUM AND HYDROCHLOROTHIAZIDE 12.5; 1 MG/1; MG/1
1 TABLET ORAL DAILY
COMMUNITY
Start: 2023-08-16 | End: 2023-11-02 | Stop reason: SDUPTHER

## 2023-11-02 RX ORDER — METOPROLOL SUCCINATE 25 MG/1
25 TABLET, EXTENDED RELEASE ORAL
COMMUNITY
Start: 2023-08-28 | End: 2023-11-02 | Stop reason: SDUPTHER

## 2023-11-02 RX ORDER — TRAZODONE HYDROCHLORIDE 300 MG/1
300 TABLET ORAL NIGHTLY
COMMUNITY
Start: 2023-05-19

## 2023-11-02 RX ORDER — DICLOFENAC SODIUM 10 MG/G
GEL TOPICAL
COMMUNITY
Start: 2022-12-21

## 2023-11-02 RX ORDER — AMLODIPINE BESYLATE 2.5 MG/1
2.5 TABLET ORAL DAILY
COMMUNITY
Start: 2022-12-26 | End: 2023-11-02 | Stop reason: ALTCHOICE

## 2023-11-02 RX ORDER — OMEPRAZOLE 20 MG/1
CAPSULE, DELAYED RELEASE ORAL
COMMUNITY
Start: 2023-06-29

## 2023-11-02 RX ORDER — LOSARTAN POTASSIUM AND HYDROCHLOROTHIAZIDE 12.5; 1 MG/1; MG/1
1 TABLET ORAL DAILY
Qty: 30 TABLET | Refills: 0 | Status: SHIPPED | OUTPATIENT
Start: 2023-11-02 | End: 2024-01-03

## 2023-11-02 RX ORDER — UMECLIDINIUM BROMIDE AND VILANTEROL TRIFENATATE 62.5; 25 UG/1; UG/1
1 POWDER RESPIRATORY (INHALATION) DAILY
COMMUNITY
Start: 2023-08-29

## 2023-11-02 RX ORDER — TRAZODONE HYDROCHLORIDE 150 MG/1
150 TABLET ORAL NIGHTLY PRN
COMMUNITY
Start: 2023-08-28

## 2023-11-02 RX ORDER — BENZONATATE 100 MG/1
CAPSULE ORAL
COMMUNITY
Start: 2023-08-15

## 2023-11-02 RX ORDER — ESOMEPRAZOLE MAGNESIUM 40 MG/1
CAPSULE, DELAYED RELEASE ORAL
COMMUNITY
Start: 2023-10-17

## 2023-11-02 RX ORDER — CEFUROXIME AXETIL 250 MG/1
250 TABLET ORAL 2 TIMES DAILY
COMMUNITY
Start: 2023-08-18 | End: 2023-08-28

## 2023-11-02 RX ORDER — APIXABAN 5 MG/1
5 TABLET, FILM COATED ORAL 2 TIMES DAILY
COMMUNITY
Start: 2023-05-19

## 2023-11-02 RX ORDER — ALPRAZOLAM 0.5 MG/1
0.5 TABLET ORAL 2 TIMES DAILY PRN
Qty: 7 TABLET | Refills: 0 | OUTPATIENT
Start: 2023-11-02

## 2023-11-02 RX ORDER — METOPROLOL SUCCINATE 25 MG/1
25 TABLET, EXTENDED RELEASE ORAL
Qty: 30 TABLET | Refills: 0 | Status: SHIPPED | OUTPATIENT
Start: 2023-11-02

## 2023-11-02 RX ORDER — TRAMADOL HYDROCHLORIDE 50 MG/1
50 TABLET ORAL 3 TIMES DAILY
COMMUNITY
Start: 2022-12-22

## 2023-11-02 RX ORDER — ALPRAZOLAM 0.5 MG/1
0.5 TABLET ORAL 2 TIMES DAILY PRN
COMMUNITY
Start: 2023-09-20

## 2023-11-02 RX ORDER — AMITRIPTYLINE HYDROCHLORIDE 25 MG/1
25 TABLET, FILM COATED ORAL NIGHTLY
COMMUNITY
Start: 2022-12-14 | End: 2023-11-02 | Stop reason: ALTCHOICE

## 2023-11-21 ENCOUNTER — APPOINTMENT (OUTPATIENT)
Dept: PRIMARY CARE | Facility: CLINIC | Age: 73
End: 2023-11-21
Payer: MEDICARE

## 2023-12-19 ENCOUNTER — APPOINTMENT (OUTPATIENT)
Dept: PRIMARY CARE | Facility: CLINIC | Age: 73
End: 2023-12-19
Payer: MEDICARE

## 2024-01-03 DIAGNOSIS — I10 PRIMARY HYPERTENSION: ICD-10-CM

## 2024-01-03 RX ORDER — LOSARTAN POTASSIUM AND HYDROCHLOROTHIAZIDE 12.5; 1 MG/1; MG/1
1 TABLET ORAL DAILY
Qty: 30 TABLET | Refills: 0 | Status: SHIPPED | OUTPATIENT
Start: 2024-01-03

## 2025-05-05 ENCOUNTER — APPOINTMENT (OUTPATIENT)
Dept: PRIMARY CARE | Facility: CLINIC | Age: 75
End: 2025-05-05
Payer: MEDICARE

## 2025-05-28 ENCOUNTER — APPOINTMENT (OUTPATIENT)
Dept: PRIMARY CARE | Facility: CLINIC | Age: 75
End: 2025-05-28
Payer: MEDICARE

## 2025-08-02 ENCOUNTER — HOSPITAL ENCOUNTER (EMERGENCY)
Facility: HOSPITAL | Age: 75
Discharge: HOME | End: 2025-08-02
Attending: EMERGENCY MEDICINE
Payer: MEDICARE

## 2025-08-02 ENCOUNTER — APPOINTMENT (OUTPATIENT)
Dept: RADIOLOGY | Facility: HOSPITAL | Age: 75
End: 2025-08-02
Payer: MEDICARE

## 2025-08-02 ENCOUNTER — APPOINTMENT (OUTPATIENT)
Dept: CARDIOLOGY | Facility: HOSPITAL | Age: 75
End: 2025-08-02
Payer: MEDICARE

## 2025-08-02 VITALS
HEART RATE: 86 BPM | DIASTOLIC BLOOD PRESSURE: 80 MMHG | OXYGEN SATURATION: 97 % | TEMPERATURE: 98.1 F | RESPIRATION RATE: 20 BRPM | WEIGHT: 175 LBS | SYSTOLIC BLOOD PRESSURE: 146 MMHG

## 2025-08-02 DIAGNOSIS — E87.1 HYPONATREMIA: ICD-10-CM

## 2025-08-02 DIAGNOSIS — R11.2 NAUSEA AND VOMITING, UNSPECIFIED VOMITING TYPE: ICD-10-CM

## 2025-08-02 DIAGNOSIS — A49.9 UTI (URINARY TRACT INFECTION), BACTERIAL: ICD-10-CM

## 2025-08-02 DIAGNOSIS — N39.0 UTI (URINARY TRACT INFECTION), BACTERIAL: ICD-10-CM

## 2025-08-02 DIAGNOSIS — R07.9 CHEST PAIN, UNSPECIFIED TYPE: Primary | ICD-10-CM

## 2025-08-02 DIAGNOSIS — E83.42 HYPOMAGNESEMIA: ICD-10-CM

## 2025-08-02 LAB
ALBUMIN SERPL BCP-MCNC: 4.5 G/DL (ref 3.4–5)
ALP SERPL-CCNC: 67 U/L (ref 33–136)
ALT SERPL W P-5'-P-CCNC: 16 U/L (ref 7–45)
ANION GAP SERPL CALC-SCNC: 19 MMOL/L (ref 10–20)
APPEARANCE UR: CLEAR
AST SERPL W P-5'-P-CCNC: 26 U/L (ref 9–39)
BACTERIA #/AREA URNS AUTO: ABNORMAL /HPF
BASOPHILS # BLD AUTO: 0.08 X10*3/UL (ref 0–0.1)
BASOPHILS NFR BLD AUTO: 1.3 %
BILIRUB SERPL-MCNC: 0.7 MG/DL (ref 0–1.2)
BILIRUB UR STRIP.AUTO-MCNC: NEGATIVE MG/DL
BNP SERPL-MCNC: 59 PG/ML (ref 0–99)
BUN SERPL-MCNC: 13 MG/DL (ref 6–23)
CALCIUM SERPL-MCNC: 9.8 MG/DL (ref 8.6–10.3)
CARDIAC TROPONIN I PNL SERPL HS: 10 NG/L (ref 0–13)
CARDIAC TROPONIN I PNL SERPL HS: 12 NG/L (ref 0–13)
CHLORIDE SERPL-SCNC: 98 MMOL/L (ref 98–107)
CO2 SERPL-SCNC: 20 MMOL/L (ref 21–32)
COLOR UR: ABNORMAL
CREAT SERPL-MCNC: 1.16 MG/DL (ref 0.5–1.05)
EGFRCR SERPLBLD CKD-EPI 2021: 50 ML/MIN/1.73M*2
EOSINOPHIL # BLD AUTO: 0.05 X10*3/UL (ref 0–0.4)
EOSINOPHIL NFR BLD AUTO: 0.8 %
ERYTHROCYTE [DISTWIDTH] IN BLOOD BY AUTOMATED COUNT: 14.8 % (ref 11.5–14.5)
FLUAV RNA RESP QL NAA+PROBE: NOT DETECTED
FLUBV RNA RESP QL NAA+PROBE: NOT DETECTED
GLUCOSE SERPL-MCNC: 99 MG/DL (ref 74–99)
GLUCOSE UR STRIP.AUTO-MCNC: NORMAL MG/DL
HCT VFR BLD AUTO: 40.5 % (ref 36–46)
HGB BLD-MCNC: 13.2 G/DL (ref 12–16)
IMM GRANULOCYTES # BLD AUTO: 0.01 X10*3/UL (ref 0–0.5)
IMM GRANULOCYTES NFR BLD AUTO: 0.2 % (ref 0–0.9)
KETONES UR STRIP.AUTO-MCNC: ABNORMAL MG/DL
LEUKOCYTE ESTERASE UR QL STRIP.AUTO: ABNORMAL
LYMPHOCYTES # BLD AUTO: 1.91 X10*3/UL (ref 0.8–3)
LYMPHOCYTES NFR BLD AUTO: 31.8 %
MAGNESIUM SERPL-MCNC: 1.58 MG/DL (ref 1.6–2.4)
MCH RBC QN AUTO: 31.4 PG (ref 26–34)
MCHC RBC AUTO-ENTMCNC: 32.6 G/DL (ref 32–36)
MCV RBC AUTO: 96 FL (ref 80–100)
MONOCYTES # BLD AUTO: 0.64 X10*3/UL (ref 0.05–0.8)
MONOCYTES NFR BLD AUTO: 10.6 %
NEUTROPHILS # BLD AUTO: 3.32 X10*3/UL (ref 1.6–5.5)
NEUTROPHILS NFR BLD AUTO: 55.3 %
NITRITE UR QL STRIP.AUTO: NEGATIVE
NRBC BLD-RTO: 0 /100 WBCS (ref 0–0)
PH UR STRIP.AUTO: 6 [PH]
PLATELET # BLD AUTO: 307 X10*3/UL (ref 150–450)
POTASSIUM SERPL-SCNC: 4.7 MMOL/L (ref 3.5–5.3)
PROT SERPL-MCNC: 7.1 G/DL (ref 6.4–8.2)
PROT UR STRIP.AUTO-MCNC: NEGATIVE MG/DL
RBC # BLD AUTO: 4.21 X10*6/UL (ref 4–5.2)
RBC # UR STRIP.AUTO: NEGATIVE MG/DL
RBC #/AREA URNS AUTO: ABNORMAL /HPF
SARS-COV-2 RNA RESP QL NAA+PROBE: NOT DETECTED
SODIUM SERPL-SCNC: 132 MMOL/L (ref 136–145)
SP GR UR STRIP.AUTO: 1.04
SQUAMOUS #/AREA URNS AUTO: ABNORMAL /HPF
TRANS CELLS #/AREA UR COMP ASSIST: ABNORMAL /HPF
UROBILINOGEN UR STRIP.AUTO-MCNC: NORMAL MG/DL
WBC # BLD AUTO: 6 X10*3/UL (ref 4.4–11.3)
WBC #/AREA URNS AUTO: ABNORMAL /HPF

## 2025-08-02 PROCEDURE — 96361 HYDRATE IV INFUSION ADD-ON: CPT

## 2025-08-02 PROCEDURE — 81001 URINALYSIS AUTO W/SCOPE: CPT

## 2025-08-02 PROCEDURE — 2550000001 HC RX 255 CONTRASTS: Performed by: EMERGENCY MEDICINE

## 2025-08-02 PROCEDURE — 2500000001 HC RX 250 WO HCPCS SELF ADMINISTERED DRUGS (ALT 637 FOR MEDICARE OP)

## 2025-08-02 PROCEDURE — 36415 COLL VENOUS BLD VENIPUNCTURE: CPT

## 2025-08-02 PROCEDURE — 85025 COMPLETE CBC W/AUTO DIFF WBC: CPT

## 2025-08-02 PROCEDURE — 74177 CT ABD & PELVIS W/CONTRAST: CPT | Performed by: RADIOLOGY

## 2025-08-02 PROCEDURE — 87636 SARSCOV2 & INF A&B AMP PRB: CPT

## 2025-08-02 PROCEDURE — 96374 THER/PROPH/DIAG INJ IV PUSH: CPT

## 2025-08-02 PROCEDURE — 71046 X-RAY EXAM CHEST 2 VIEWS: CPT

## 2025-08-02 PROCEDURE — 83880 ASSAY OF NATRIURETIC PEPTIDE: CPT

## 2025-08-02 PROCEDURE — 71046 X-RAY EXAM CHEST 2 VIEWS: CPT | Performed by: RADIOLOGY

## 2025-08-02 PROCEDURE — 74177 CT ABD & PELVIS W/CONTRAST: CPT

## 2025-08-02 PROCEDURE — 84484 ASSAY OF TROPONIN QUANT: CPT

## 2025-08-02 PROCEDURE — 2500000004 HC RX 250 GENERAL PHARMACY W/ HCPCS (ALT 636 FOR OP/ED)

## 2025-08-02 PROCEDURE — 84075 ASSAY ALKALINE PHOSPHATASE: CPT

## 2025-08-02 PROCEDURE — 83735 ASSAY OF MAGNESIUM: CPT

## 2025-08-02 PROCEDURE — 87186 SC STD MICRODIL/AGAR DIL: CPT | Mod: PARLAB

## 2025-08-02 PROCEDURE — 93005 ELECTROCARDIOGRAM TRACING: CPT

## 2025-08-02 PROCEDURE — 99285 EMERGENCY DEPT VISIT HI MDM: CPT | Mod: 25 | Performed by: EMERGENCY MEDICINE

## 2025-08-02 RX ORDER — METOCLOPRAMIDE 10 MG/1
10 TABLET ORAL EVERY 6 HOURS
Qty: 28 TABLET | Refills: 0 | Status: SHIPPED | OUTPATIENT
Start: 2025-08-02 | End: 2025-08-09

## 2025-08-02 RX ORDER — LANOLIN ALCOHOL/MO/W.PET/CERES
400 CREAM (GRAM) TOPICAL DAILY
Status: DISCONTINUED | OUTPATIENT
Start: 2025-08-02 | End: 2025-08-02 | Stop reason: HOSPADM

## 2025-08-02 RX ORDER — METOCLOPRAMIDE HYDROCHLORIDE 5 MG/ML
10 INJECTION INTRAMUSCULAR; INTRAVENOUS ONCE
Status: COMPLETED | OUTPATIENT
Start: 2025-08-02 | End: 2025-08-02

## 2025-08-02 RX ORDER — CEPHALEXIN 500 MG/1
500 CAPSULE ORAL ONCE
Status: COMPLETED | OUTPATIENT
Start: 2025-08-02 | End: 2025-08-02

## 2025-08-02 RX ORDER — ALPRAZOLAM 0.25 MG/1
0.5 TABLET ORAL ONCE
Status: COMPLETED | OUTPATIENT
Start: 2025-08-02 | End: 2025-08-02

## 2025-08-02 RX ORDER — METOCLOPRAMIDE 10 MG/1
10 TABLET ORAL EVERY 6 HOURS
Qty: 28 TABLET | Refills: 0 | Status: SHIPPED | OUTPATIENT
Start: 2025-08-02 | End: 2025-08-02

## 2025-08-02 RX ORDER — CEPHALEXIN 500 MG/1
500 CAPSULE ORAL 4 TIMES DAILY
Qty: 28 CAPSULE | Refills: 0 | Status: SHIPPED | OUTPATIENT
Start: 2025-08-02 | End: 2025-08-02

## 2025-08-02 RX ORDER — CEPHALEXIN 500 MG/1
500 CAPSULE ORAL 4 TIMES DAILY
Qty: 28 CAPSULE | Refills: 0 | Status: SHIPPED | OUTPATIENT
Start: 2025-08-02 | End: 2025-08-07

## 2025-08-02 RX ADMIN — SODIUM CHLORIDE 1000 ML: 0.9 INJECTION, SOLUTION INTRAVENOUS at 12:57

## 2025-08-02 RX ADMIN — IOHEXOL 75 ML: 350 INJECTION, SOLUTION INTRAVENOUS at 13:07

## 2025-08-02 RX ADMIN — CEPHALEXIN 500 MG: 500 CAPSULE ORAL at 15:23

## 2025-08-02 RX ADMIN — METOCLOPRAMIDE 10 MG: 5 INJECTION, SOLUTION INTRAMUSCULAR; INTRAVENOUS at 14:41

## 2025-08-02 RX ADMIN — ALPRAZOLAM 0.5 MG: 0.25 TABLET ORAL at 11:28

## 2025-08-02 RX ADMIN — MAGNESIUM OXIDE TAB 400 MG (241.3 MG ELEMENTAL MG) 1 TABLET: 400 (241.3 MG) TAB at 12:57

## 2025-08-02 ASSESSMENT — LIFESTYLE VARIABLES
EVER HAD A DRINK FIRST THING IN THE MORNING TO STEADY YOUR NERVES TO GET RID OF A HANGOVER: NO
HAVE PEOPLE ANNOYED YOU BY CRITICIZING YOUR DRINKING: NO
TOTAL SCORE: 0
EVER FELT BAD OR GUILTY ABOUT YOUR DRINKING: NO
HAVE YOU EVER FELT YOU SHOULD CUT DOWN ON YOUR DRINKING: NO

## 2025-08-02 NOTE — DISCHARGE INSTRUCTIONS
Prescribed Keflex 4 times daily for 7 days.  Also prescribed Reglan as needed for nausea and vomiting.  Recommend scheduling a follow-up appointment with your primary care physician within 2 to 3 days for regarding today's emergency department visit.  Return to the emergency department with any new or worsening symptoms.

## 2025-08-02 NOTE — ED TRIAGE NOTES
Pt presents to ED with c/o generalized weakness, rapid heart rate, nausea, vomiting and diarrhea x 3 days. Pt states she has not been able to take any of her medications.

## 2025-08-03 NOTE — ED PROVIDER NOTES
HPI   Chief Complaint   Patient presents with    Generalized Body Aches       75-year-old female presenting to the emergency department from home for evaluation of generalized weakness.  Patient reports increasing generalized weakness and fatigue over the past 3 days.  States she has episodic heart racing sensation with associated central chest discomfort as well. Reports acute on chronic nausea, vomiting, and diarrhea. She has been unable to tolerate much p.o. and take any of her medications today.  No reported recent falls or injuries.  Denies fevers, chills, headache, lightheadedness, dizziness, cough/cold symptoms, shortness of breath, abdominal pain, dysuria, hematuria, melena, hematochezia.               Patient History   Medical History[1]  Surgical History[2]  Family History[3]  Social History[4]    Physical Exam   ED Triage Vitals [08/02/25 1112]   Temperature Heart Rate Respirations BP   36.7 °C (98.1 °F) 73 18 (!) 143/98      Pulse Ox Temp src Heart Rate Source Patient Position   98 % -- -- --      BP Location FiO2 (%)     -- --       Physical Exam  Vitals and nursing note reviewed.   Constitutional:       Appearance: Normal appearance.   HENT:      Head: Atraumatic.      Nose: Nose normal.      Mouth/Throat:      Mouth: Mucous membranes are moist.     Eyes:      Extraocular Movements: Extraocular movements intact.      Conjunctiva/sclera: Conjunctivae normal.       Cardiovascular:      Rate and Rhythm: Normal rate. Rhythm irregular.   Pulmonary:      Effort: Pulmonary effort is normal.      Breath sounds: Normal breath sounds.   Abdominal:      General: Abdomen is flat.      Palpations: Abdomen is soft.      Tenderness: There is generalized abdominal tenderness. There is no right CVA tenderness, left CVA tenderness, guarding or rebound.     Musculoskeletal:         General: Normal range of motion.      Cervical back: Neck supple.     Skin:     General: Skin is warm and dry.      Capillary Refill:  Capillary refill takes less than 2 seconds.     Neurological:      Mental Status: She is alert and oriented to person, place, and time.     Psychiatric:         Mood and Affect: Mood normal.           ED Course & MDM   Diagnoses as of 08/03/25 1246   Chest pain, unspecified type   UTI (urinary tract infection), bacterial   Hyponatremia   Hypomagnesemia   Nausea and vomiting, unspecified vomiting type                 No data recorded                                 Medical Decision Making  75-year-old female presenting to the emergency department from home for evaluation of generalized weakness.  Vital signs reviewed and stable.  Patient's blood pressure is elevated at 143/98 on arrival although asymptomatic.  0.5 mg Xanax administered for anxiety.  EKG interpreted independently as atrial fibrillation, rate 64, QRS 96, QTc 426.  No acute injury pattern.  High-sensitivity troponin negative ruling out ACS.  BNP 59. CBC without leukocytosis or anemia.  CMP for borderline hyponatremia 132, elevated creatinine 1.16, EGFR 50.  Hypomagnesemia 1.58, po magnesium replacement administered.  1L NS bolus administered as well. UA concerning for acute urinary tract infection, first dose of Keflex was administered in the emergency department.  Viral testing negative.  CXR without evidence of acute cardiopulmonary process.  CT abdomen and pelvis with IV contrast obtained and also showing no acute abdominal or pelvic pathology among multiple incidental findings.  She was informed of lab and imaging findings, all questions and concerns were answered.  A referral was placed for her to establish and schedule close follow-up appointment with a gastroenterologist.  Prescribed reglan as needed for nausea.  We discussed strict return precautions to return to the emergency department with any new or worsening symptoms.  Patient expresses understanding and was agreeable to plan of care, she was discharged in stable  condition.        Procedure  Procedures       [1] No past medical history on file.  [2] No past surgical history on file.  [3] No family history on file.  [4]   Social History  Tobacco Use    Smoking status: Not on file    Smokeless tobacco: Not on file   Substance Use Topics    Alcohol use: Not on file    Drug use: Not on file        Alma Patrick PA-C  08/03/25 [id number]

## 2025-08-04 LAB
ATRIAL RATE: 0 BPM
HOLD SPECIMEN: NORMAL
P AXIS: 28 DEGREES
PR INTERVAL: 63 MS
Q ONSET: 251 MS
QRS COUNT: 12 BEATS
QRS DURATION: 100 MS
QT INTERVAL: 381 MS
QTC CALCULATION(BAZETT): 423 MS
QTC FREDERICIA: 408 MS
R AXIS: -14 DEGREES
T AXIS: 15 DEGREES
T OFFSET: 441 MS
VENTRICULAR RATE: 74 BPM

## 2025-08-05 LAB — BACTERIA UR CULT: ABNORMAL

## 2025-08-06 ENCOUNTER — RESULTS FOLLOW-UP (OUTPATIENT)
Dept: PHARMACY | Facility: HOSPITAL | Age: 75
End: 2025-08-06
Payer: MEDICARE

## 2025-08-06 DIAGNOSIS — N39.0 UTI (URINARY TRACT INFECTION), UNCOMPLICATED: Primary | ICD-10-CM

## 2025-08-07 RX ORDER — CIPROFLOXACIN 250 MG/1
250 TABLET, FILM COATED ORAL 2 TIMES DAILY
Qty: 6 TABLET | Refills: 0 | Status: SHIPPED | OUTPATIENT
Start: 2025-08-07 | End: 2025-08-10

## 2025-08-07 NOTE — PROGRESS NOTES
EDPD Note: Bug-Drug Mismatch    Contacted Mr./Mrs./Ms. Kristin Bailey regarding a positive urine culture/result that was taken during their recent emergency room visit. I completed education with patient. The patient is not being treated appropriately with Keflex 500 mg QID for 7 days due to the culture being resistant to this medication. Patient presented to ED for increasing generalized weakness, chest pain, and fatigue. Reports acute on chronic nausea, vomiting, and diarrhea No urinary symptoms noted at ED visit.   At the time of call, patient reported urinary frequency and some abdominal discomfort. I recommended initially, Bactrim DS BID for 3 days. However, patient endorsed an allergy to Bactrim that causes itching and flushing when she takes it. I will recommend that she starts Ciprofloxacin 250 mg BID for 3 days. Advised patient to finish full course of antibiotic and follow up with PCP or urgent care if new symptoms.     The following prescription was sent to the patient's preferred pharmacy. No further follow up needed from EDPD Team.   Drug: Ciprofloxacin 250 mg  Sig: Take 1 tablet PO BID  Days Supply: 3 days  Qty: 6    Susceptibility data from last 90 days.  Collected Specimen Info Organism Amoxicillin/Clavulanate Ampicillin Ampicillin/Sulbactam Cefazolin Cefepime Ciprofloxacin Gentamicin Levofloxacin Nitrofurantoin Piperacillin/Tazobactam Trimethoprim/Sulfamethoxazole   08/02/25 Urine from Clean Catch/Voided Klebsiella (Enterobacter) aerogenes  R  R  R  R  S  S  S  S  I  S  SHAMA No

## 2025-08-20 ENCOUNTER — APPOINTMENT (OUTPATIENT)
Dept: RADIOLOGY | Facility: CLINIC | Age: 75
End: 2025-08-20
Payer: MEDICARE